# Patient Record
Sex: MALE | Race: AMERICAN INDIAN OR ALASKA NATIVE
[De-identification: names, ages, dates, MRNs, and addresses within clinical notes are randomized per-mention and may not be internally consistent; named-entity substitution may affect disease eponyms.]

---

## 2019-10-22 ENCOUNTER — HOSPITAL ENCOUNTER (OUTPATIENT)
Dept: HOSPITAL 5 - GIO | Age: 49
Discharge: HOME | End: 2019-10-22
Attending: INTERNAL MEDICINE
Payer: OTHER GOVERNMENT

## 2019-10-22 VITALS — SYSTOLIC BLOOD PRESSURE: 150 MMHG | DIASTOLIC BLOOD PRESSURE: 83 MMHG

## 2019-10-22 DIAGNOSIS — K31.84: ICD-10-CM

## 2019-10-22 DIAGNOSIS — Z79.4: ICD-10-CM

## 2019-10-22 DIAGNOSIS — Z79.899: ICD-10-CM

## 2019-10-22 DIAGNOSIS — G47.30: ICD-10-CM

## 2019-10-22 DIAGNOSIS — Z12.11: Primary | ICD-10-CM

## 2019-10-22 DIAGNOSIS — K21.0: ICD-10-CM

## 2019-10-22 DIAGNOSIS — N18.6: ICD-10-CM

## 2019-10-22 DIAGNOSIS — E11.43: ICD-10-CM

## 2019-10-22 DIAGNOSIS — K29.50: ICD-10-CM

## 2019-10-22 DIAGNOSIS — E78.00: ICD-10-CM

## 2019-10-22 DIAGNOSIS — B96.81: ICD-10-CM

## 2019-10-22 DIAGNOSIS — E11.22: ICD-10-CM

## 2019-10-22 DIAGNOSIS — D12.2: ICD-10-CM

## 2019-10-22 DIAGNOSIS — Z98.890: ICD-10-CM

## 2019-10-22 DIAGNOSIS — K64.8: ICD-10-CM

## 2019-10-22 DIAGNOSIS — I12.0: ICD-10-CM

## 2019-10-22 DIAGNOSIS — K57.30: ICD-10-CM

## 2019-10-22 DIAGNOSIS — Z91.013: ICD-10-CM

## 2019-10-22 PROCEDURE — 88342 IMHCHEM/IMCYTCHM 1ST ANTB: CPT

## 2019-10-22 PROCEDURE — 45385 COLONOSCOPY W/LESION REMOVAL: CPT

## 2019-10-22 PROCEDURE — 82962 GLUCOSE BLOOD TEST: CPT

## 2019-10-22 PROCEDURE — 43239 EGD BIOPSY SINGLE/MULTIPLE: CPT

## 2019-10-22 PROCEDURE — 88305 TISSUE EXAM BY PATHOLOGIST: CPT

## 2019-10-22 PROCEDURE — 45380 COLONOSCOPY AND BIOPSY: CPT

## 2019-10-22 NOTE — OPERATIVE REPORT
PROCEDURE:  Colonoscopy.



INDICATIONS:  This is a 49-year-old -American gentleman with an

underlying history of diabetes mellitus, hypertension and end-stage renal

disease, who had a colonoscopy done as part of colon polyp screening.



DESCRIPTION OF PROCEDURE:  Prior to the procedure, he had an EGD done, which

showed presence of moderate erosive esophagitis, gastritis and gastroparesis. 

Colonoscopy was done after getting informed consent with MAC anesthesia. 

Procedure was done in the GI lab with assistance of the GI lab team, which

included RN, Haley Simons, Kate washington, and with assistance of anesthesia. 

Initial rectal exam was unremarkable.  Instrument was passed through the rectum

onto the cecum, which was identified with ileocecal valve and appendiceal

orifice.  Visualization was fair to good.  In the proximal ascending colon that

was close to the ileocecal valve, there was a small 7-8 mm polyp noted that was

removed by cold biopsy.  In the distal ascending colon close to the transverse

colon, there were 2 larger polyps about 12 mm in diameter and a stalk which was

removed by snare excision with application of heat and then both were retrieved.

 The remaining part of the transverse colon except for the distal transverse

colon was essentially unremarkable.  In the distal transverse colon, there was

again a 12-mm polyp that was removed by snare polypectomy, but could not be

retrieved.  The left colon showed few minor diverticula and the rectum showed

mild to moderate internal hemorrhoid on the retroverted view.  There was minimal

bleeding from the polypectomy sites and no complications associated with the

procedure.



ASSESSMENT:  Colon polyp screening, multiple colon polyps and noted mainly in

the proximal colon.  In the distal transverse colon, minor left colon

diverticular disease, mild to moderate internal hemorrhoid.  There was minimal

bleeding associated with the procedure.  No complications associated with the

procedure.  The patient will be encouraged to take fiber supplements, avoid

aspirin and aspirin-related products for the next few days.  Otherwise, resume

previous medication.  The patient will be treated with PPI and Reglan because of

the EGD findings of erosive esophagitis, gastritis and gastroparesis and asked

to follow up in the office in 1-2 weeks' time.





DD: 10/22/2019 09:05

DT: 10/22/2019 09:42

JOB# [id number]  [id number]

ABDIRAHMAN/SAUL

## 2019-10-22 NOTE — PROCEDURE NOTE
Date of procedure: 10/22/19


Pre-op diagnosis: GERD/Colon Polyp Screening


Post-op diagnosis: other (Mild to Moderate Erosive Esophagitis/Gastritis/Gastric

Bezoar (secondary Diabetic Gastropareis)/Multiple Colon Polyps (removed snare 

excision and cold Biopsy from the Proximal Colon and Distal,Transverse Colon).Mi

nor,Left Colon Diverticuli and Mild to Moderate Internal Hemorrhoid)


Procedure: 





EGD with Biopsy and Colonoscopy and Snare Polypectomy and cold Biopsy


Anesthesia: [facility] [place]


Surgeon: CHELY CARRIZALES


Estimated blood loss: minimal


Pathology: list


Specimen disposition: to lab


Condition: stable


Disposition: same day (Treat with PPI and Reglan and encourage fiber intake.  

Avoid aspirin and NSAID for 4 days; otherwise resume home medication. Follow up 

in 1 to 2 weeks ([phone]).)

## 2019-10-22 NOTE — OPERATIVE REPORT
PROCEDURE:  EGD with biopsy.



INDICATIONS:  This is a 49-year-old -American gentleman with an

underlying history of diabetes mellitus type 2, hypertension, end-stage renal

disease who has been having GERD symptoms.  EGD was done to assess for any

significant upper GI pathology



PROCEDURE:  The procedure was done after getting informed consent with MAC

anesthesia.  Instrument was passed through the hypopharynx into the esophagus,

which showed moderate erosive esophagitis.  Biopsy was done from the distal

esophagus.  Stomach showed presence of gastric bezoar suggestive of diabetic

gastroparesis and gastritis.  Biopsy was done from the gastric antrum, gastric

body and angular incisura to rule out for H. pylori and atrophic gastritis. 

There was minimal bleeding from the biopsy sites.  The pylorus was patent.  The

duodenum in the first and second portion appeared normal.  Again, there was

minimal bleeding from the biopsy sites.  No complications associated with the

procedure.



ASSESSMENT:  Gastroesophageal reflux disease symptoms, moderate erosive

esophagitis, diabetic gastroparesis, gastritis.



PLAN:  To treat the patient with PPI and Reglan.  Have the patient to avoid

aspirin and aspirin-related products for the next few days and follow up in the

office in 1-2 weeks' time.  The patient will be placed on PPI and Reglan to help

with GERD symptoms with his GERD symptoms and also with gastroparesis.  A

colonoscopy will be done as part of colon polyp screening.  Procedure was done

in the GI lab with assistance of the GI lab team, which included BELKIS, Kate Pop and with assistance of anesthesia.





DD: 10/22/2019 09:01

DT: 10/22/2019 09:33

JOB# [id number]  [id number]

ABDIRAHMAN/SAUL

## 2019-10-22 NOTE — ANESTHESIA CONSULTATION
Anesthesia Consult and Med Hx


Date of service: 10/22/19





- Airway


Anesthetic Teeth Evaluation: Good


ROM Head & Neck: Adequate


Mental/Hyoid Distance: Adequate


Mallampati Class: Class II


Intubation Access Assessment: Probably Good





- Pulmonary Exam


CTA: Yes





- Cardiac Exam


Cardiac Exam: RRR





- Pre-Operative Health Status


ASA Pre-Surgery Classification: ASA3


Proposed Anesthetic Plan: General, MAC





- Pulmonary


Hx Sleep Apnea: Yes (no CPAP)





- Cardiovascular System


Hx Hypertension: Yes





- Gastrointestinal


Hx Gastroesophageal Reflux Disease: Yes (Food related, diet controlled)





- Endocrine


Hx Renal Disease: Yes


Hx End Stage Renal Disease: Yes (ESRDz, on Dialysis. MWF schedule.)


Hx Insulin Dependent Diabetes: Yes





- Additional Comments


Anesthesia Medical History Comments: Blind, secondary to Diabetic Retinopathy

## 2020-11-13 ENCOUNTER — HOSPITAL ENCOUNTER (INPATIENT)
Dept: HOSPITAL 5 - ED | Age: 50
LOS: 5 days | Discharge: HOME | DRG: 177 | End: 2020-11-18
Attending: INTERNAL MEDICINE | Admitting: INTERNAL MEDICINE
Payer: MEDICAID

## 2020-11-13 DIAGNOSIS — J12.89: ICD-10-CM

## 2020-11-13 DIAGNOSIS — J96.01: ICD-10-CM

## 2020-11-13 DIAGNOSIS — Z83.3: ICD-10-CM

## 2020-11-13 DIAGNOSIS — Z82.49: ICD-10-CM

## 2020-11-13 DIAGNOSIS — Z79.4: ICD-10-CM

## 2020-11-13 DIAGNOSIS — U07.1: Primary | ICD-10-CM

## 2020-11-13 DIAGNOSIS — E11.22: ICD-10-CM

## 2020-11-13 DIAGNOSIS — Z79.899: ICD-10-CM

## 2020-11-13 DIAGNOSIS — D63.1: ICD-10-CM

## 2020-11-13 DIAGNOSIS — N18.6: ICD-10-CM

## 2020-11-13 DIAGNOSIS — I12.0: ICD-10-CM

## 2020-11-13 DIAGNOSIS — Z99.2: ICD-10-CM

## 2020-11-13 DIAGNOSIS — K21.9: ICD-10-CM

## 2020-11-13 DIAGNOSIS — Z91.013: ICD-10-CM

## 2020-11-13 DIAGNOSIS — N25.81: ICD-10-CM

## 2020-11-13 DIAGNOSIS — Z63.5: ICD-10-CM

## 2020-11-13 LAB
ALBUMIN SERPL-MCNC: 3.5 G/DL (ref 3.9–5)
ALT SERPL-CCNC: 6 UNITS/L (ref 7–56)
BASOPHILS # (AUTO): 0 K/MM3 (ref 0–0.1)
BASOPHILS NFR BLD AUTO: 0.6 % (ref 0–1.8)
BUN SERPL-MCNC: 59 MG/DL (ref 9–20)
BUN/CREAT SERPL: 5 %
CALCIUM SERPL-MCNC: 8.6 MG/DL (ref 8.4–10.2)
EOSINOPHIL # BLD AUTO: 0 K/MM3 (ref 0–0.4)
EOSINOPHIL NFR BLD AUTO: 0.1 % (ref 0–4.3)
HCT VFR BLD CALC: 29.9 % (ref 35.5–45.6)
HEMOLYSIS INDEX: 1
HGB BLD-MCNC: 9.7 GM/DL (ref 11.8–15.2)
LYMPHOCYTES # BLD AUTO: 1.6 K/MM3 (ref 1.2–5.4)
LYMPHOCYTES NFR BLD AUTO: 29 % (ref 13.4–35)
MCHC RBC AUTO-ENTMCNC: 32 % (ref 32–34)
MCV RBC AUTO: 79 FL (ref 84–94)
MONOCYTES # (AUTO): 0.7 K/MM3 (ref 0–0.8)
MONOCYTES % (AUTO): 12 % (ref 0–7.3)
PLATELET # BLD: 268 K/MM3 (ref 140–440)
RBC # BLD AUTO: 3.77 M/MM3 (ref 3.65–5.03)

## 2020-11-13 PROCEDURE — U0003 INFECTIOUS AGENT DETECTION BY NUCLEIC ACID (DNA OR RNA); SEVERE ACUTE RESPIRATORY SYNDROME CORONAVIRUS 2 (SARS-COV-2) (CORONAVIRUS DISEASE [COVID-19]), AMPLIFIED PROBE TECHNIQUE, MAKING USE OF HIGH THROUGHPUT TECHNOLOGIES AS DESCRIBED BY CMS-2020-01-R: HCPCS

## 2020-11-13 PROCEDURE — 71045 X-RAY EXAM CHEST 1 VIEW: CPT

## 2020-11-13 PROCEDURE — 87040 BLOOD CULTURE FOR BACTERIA: CPT

## 2020-11-13 PROCEDURE — 80074 ACUTE HEPATITIS PANEL: CPT

## 2020-11-13 PROCEDURE — 96372 THER/PROPH/DIAG INJ SC/IM: CPT

## 2020-11-13 PROCEDURE — 96375 TX/PRO/DX INJ NEW DRUG ADDON: CPT

## 2020-11-13 PROCEDURE — 93005 ELECTROCARDIOGRAM TRACING: CPT

## 2020-11-13 PROCEDURE — 86140 C-REACTIVE PROTEIN: CPT

## 2020-11-13 PROCEDURE — 80048 BASIC METABOLIC PNL TOTAL CA: CPT

## 2020-11-13 PROCEDURE — 82962 GLUCOSE BLOOD TEST: CPT

## 2020-11-13 PROCEDURE — 84145 PROCALCITONIN (PCT): CPT

## 2020-11-13 PROCEDURE — 82728 ASSAY OF FERRITIN: CPT

## 2020-11-13 PROCEDURE — 94760 N-INVAS EAR/PLS OXIMETRY 1: CPT

## 2020-11-13 PROCEDURE — 80053 COMPREHEN METABOLIC PANEL: CPT

## 2020-11-13 PROCEDURE — 36415 COLL VENOUS BLD VENIPUNCTURE: CPT

## 2020-11-13 PROCEDURE — 5A1D70Z PERFORMANCE OF URINARY FILTRATION, INTERMITTENT, LESS THAN 6 HOURS PER DAY: ICD-10-PCS | Performed by: INTERNAL MEDICINE

## 2020-11-13 PROCEDURE — 85025 COMPLETE CBC W/AUTO DIFF WBC: CPT

## 2020-11-13 PROCEDURE — 85379 FIBRIN DEGRADATION QUANT: CPT

## 2020-11-13 PROCEDURE — 83615 LACTATE (LD) (LDH) ENZYME: CPT

## 2020-11-13 RX ADMIN — METOCLOPRAMIDE SCH: 10 TABLET ORAL at 23:39

## 2020-11-13 SDOH — SOCIAL STABILITY - SOCIAL INSECURITY: DISRUPTION OF FAMILY BY SEPARATION AND DIVORCE: Z63.5

## 2020-11-13 NOTE — HISTORY AND PHYSICAL REPORT
History of Present Illness


Chief complaint: 





I feel sick


History of present illness: 


51 YO Male with ESRD on HD(M,W,F), HTN, DM, GERD presents to ED for evaluation. 

Patient states that he has been "feeling sick" over the past 5 days with 

progressive symptoms over the same timeframe.  Patient acknowledges fever, 

malaise, weakness, decreased exercise tolerance, nausea, multiple episodes of 

vomiting, fatigue, as well as a decreased sense of taste.  EMS was notified and 

upon arrival the patient was found to be in distress and subsequently 

transported to [facility] for further care and evaluation of the aforementioned sympt

oms.  Patient seen and evaluated in the emergency department.  All lab and 

imaging studies reviewed.  Patient underwent chest x-ray and found to have 

evidence of bilateral pneumonia.  Patient admitted to medical floor due to 

increased risk of decompensation.  Patient initiated on coronavirus protocol in 

the emergency department prior to my evaluation.  Patient acknowledges fever but

denies chills, chest pain, palpitations, skin rash, recent ill contacts, 

prolonged travel/immobility, unilateral leg swelling, calf pain, 

individual/family history of DVT/PE/bleeding/blood clotting disorders, or known 

exposure to COVID-19.





Past History


Past Medical History: diabetes, ESRD, GERD, hypertension


Past Surgical History: Other (Dialysis access)


Social history: .  denies: smoking, alcohol abuse, prescription drug 

abuse


Family history: diabetes, hypertension





Medications and Allergies


                                    Allergies











Allergy/AdvReac Type Severity Reaction Status Date / Time


 


shellfish derived Allergy  Anaphylaxis Verified 10/22/19 07:36











                                Home Medications











 Medication  Instructions  Recorded  Confirmed  Last Taken  Type


 


Insulin Aspart (Nf) [Novolog] 7 units SQ TID 10/22/19 10/22/19 10/21/19 17:00 

History


 


Metoclopramide [Reglan] 10 mg PO TID 30 Days #40 tab 10/22/19  Unknown Rx


 


Pantoprazole [Protonix] 40 mg PO QDAY 30 Days #30 tablet 10/22/19  Unknown Rx


 


cloNIDine [Catapres] 0.1 mg PO DAILY 10/22/19 10/22/19 10/22/19 06:00 History














Review of Systems


Constitutional: fever, weakness, malaise, lethargy, no weight loss, no weight 

gain


Ears, nose, mouth and throat: no ear pain, no ear discharge, no tinnitis, no 

decreased hearing, no nose pain


Cardiovascular: no chest pain, no orthopnea, no palpitations, no rapid/irregular

 heart beat, no edema


Respiratory: no cough, no cough with sputum, no excessive sputum


Gastrointestinal: no abdominal pain, no nausea, no vomiting, no diarrhea, no 

constipation


Genitourinary Male: no hematuria, no flank pain, no discharge, no urinary 

frequency, no urinary hesitancy


Rectal: no pain, no incontinence, no bleeding


Musculoskeletal: no neck stiffness, no neck pain, no shooting arm pain, no arm 

numbness/tingling, no low back pain


Integumentary: no rash, no pruritis, no redness, no sores, no wounds


Neurological: no transient paralysis, no paralysis, no weakness, no parathesias,

 no numbness, no tingling, no seizures, no syncope, no tremors


Psychiatric: no anxiety, no memory loss, no change in sleep habits, no sleep 

disturbances, no insomnia


Endocrine: no cold intolerance, no heat intolerance, no polyphagia, no excessive

 thirst, no excessive sweating


Hematologic/Lymphatic: no easy bruising, no lymphedema


Allergic/Immunologic: no urticaria, no allergic rhinitis, no persistent 

infections





Exam





- Constitutional


Vitals: 


                                        











Temp Pulse Resp BP Pulse Ox


 


 99.9 F H  84   10 L  115/72   90 


 


 11/13/20 08:17  11/13/20 10:45  11/13/20 10:45  11/13/20 10:45  11/13/20 10:45











General appearance: Present: mild distress





- EENT


Eyes: Present: PERRL


ENT: hearing intact, clear oral mucosa





- Neck


Neck: Present: supple, normal ROM





- Respiratory


Respiratory effort: normal


Respiratory: bilateral: diminished, rhonchi





- Cardiovascular


Heart Sounds: Present: S1 & S2.  Absent: rub, click





- Extremities


Extremities: pulses symmetrical, No edema


Peripheral Pulses: within normal limits





- Abdominal


General gastrointestinal: Present: soft, non-tender, non-distended, normal bowel

 sounds


Male genitourinary: Present: normal





- Integumentary


Integumentary: Present: clear, warm, dry





- Musculoskeletal


Musculoskeletal: gait normal, strength equal bilaterally





- Psychiatric


Psychiatric: appropriate mood/affect, intact judgment & insight





- Neurologic


Neurologic: CNII-XII intact, moves all extremities





Results





- Labs


CBC & Chem 7: 


                                 11/13/20 09:03





                                 11/13/20 09:03


Labs: 


                              Abnormal lab results











  11/13/20 11/13/20 Range/Units





  09:03 09:03 


 


Hgb  9.7 L   (11.8-15.2)  gm/dl


 


Hct  29.9 L   (35.5-45.6)  %


 


MCV  79 L   (84-94)  fl


 


MCH  26 L   (28-32)  pg


 


RDW  18.4 H   (13.2-15.2)  %


 


Mono % (Auto)  12.0 H   (0.0-7.3)  %


 


Sodium   135 L  (137-145)  mmol/L


 


Chloride   91.6 L  ()  mmol/L


 


BUN   59 H  (9-20)  mg/dL


 


Creatinine   12.0 H  (0.8-1.3)  mg/dL


 


Glucose   138 H  ()  mg/dL


 


ALT   6 L  (7-56)  units/L


 


Albumin   3.5 L  (3.9-5)  g/dL














Assessment and Plan





- Patient Problems


(1) Pneumonia


Current Visit: Yes   Status: Acute   


Plan to address problem: 


Pneumonia protocol: CBC, BMP, chest x-ray, supplemental oxygen, nebulizer 

therapy, IV antibiotic therapy, blood culture, supportive care.








(2) ESRD needing dialysis


Current Visit: Yes   Status: Acute   


Plan to address problem: 


Nephrology team consulted in ED, strict I's/O, monitor urine output every shift,

 avoid nephrotoxic agents, dialysis as per renal team.








(3) Suspected 2019 novel coronavirus infection


Current Visit: Yes   Status: Acute   


Plan to address problem: 


Coronavirus protocol: Coronavirus PCR ordered prior to my evaluation in the 

emergency department, IV steroid therapy, contact isolation, respiratory 

isolation, supportive care.








(4) Hypertension


Current Visit: Yes   Status: Acute   


Qualifiers: 


   Hypertension type: essential hypertension   Qualified Code(s): I10 - 

Essential (primary) hypertension   


Plan to address problem: 


Monitor blood pressure every shift, continue medical management.








(5) Diabetes


Current Visit: Yes   Status: Acute   


Plan to address problem: 


Sliding-scale insulin therapy, Accu-Chek, consistent carbohydrate diet, 

hypoglycemia protocol.








(6) GERD (gastroesophageal reflux disease)


Current Visit: Yes   Status: Acute   


Qualifiers: 


   Esophagitis presence: without esophagitis   Qualified Code(s): K21.9 - 

Gastro-esophageal reflux disease without esophagitis   


Plan to address problem: 


PPI therapy, supportive care.  Outpatient GI follow-up.








(7) DVT prophylaxis


Current Visit: Yes   Status: Acute   


Plan to address problem: 


SCD to bilateral lower extremities while in bed, prophylactic anticoagulation

## 2020-11-13 NOTE — XRAY REPORT
XR chest 1V ap



INDICATION / CLINICAL INFORMATION:

cough, fever.



COMPARISON: 

None



FINDINGS:

There are patchy bibasilar airspace opacities. No pleural effusion or pneumothorax. Heart is accentua
lion without significant pulmonary vasculature congestion.



IMPRESSION:

Patchy bibasilar airspace opacities, suspicious for pneumonia.



Signer Name: Micah Gonzalez MD 

Signed: 11/13/2020 9:25 AM

Workstation Name: [id number]

## 2020-11-13 NOTE — EMERGENCY DEPARTMENT REPORT
HPI





- General


Chief Complaint: Fever


Time Seen by Provider: 11/13/20 08:27





- HPI


HPI: 





This is a 50-year-old -American male who presents to the emergency 

department with a complaint of a 4 to 5-day history of nausea, vomiting and 

fatigue.  Patient presents with a low-grade fever.  He has a past medical 

history of diabetes, hypertension and end-stage renal disease on hemodialysis on

Monday/Wednesday/Friday.  The patient does not know the name of his nephrologist

but says he gets dialyzed at [facility] and last had dialysis on 

Wednesday, 2 days ago.  Patient has a mild cough but denies any chest pain or 

shortness of breath.  He denies any abdominal pain to go along with the 

expressed nausea/vomiting.  He has not taken anything for symptoms prior to 

presentation.  No recent travel or sick contacts at home.  No known exposure to 

anyone with COVID-19.





ED Past Medical Hx





- Past Medical History


Previous Medical History?: Yes


Hx Hypertension: Yes


Hx Diabetes: Yes


Hx GERD: Yes


Hx Renal Disease: Yes





- Surgical History


Past Surgical History?: Yes


Additional Surgical History: graft





- Social History


Smoking Status: Never Smoker


Substance Use Type: None





- Medications


Home Medications: 


                                Home Medications











 Medication  Instructions  Recorded  Confirmed  Last Taken  Type


 


Insulin Aspart (Nf) [Novolog] 7 units SQ TID 10/22/19 10/22/19 10/21/19 17:00 

History


 


Metoclopramide [Reglan] 10 mg PO TID 30 Days #40 tab 10/22/19  Unknown Rx


 


Pantoprazole [Protonix] 40 mg PO QDAY 30 Days #30 tablet 10/22/19  Unknown Rx


 


cloNIDine [Catapres] 0.1 mg PO DAILY 10/22/19 10/22/19 10/22/19 06:00 History














ED Review of Systems


ROS: 


Stated complaint: PUI


Other details as noted in HPI





Comment: All other systems reviewed and negative


Constitutional: fever.  denies: diaphoresis


Eyes: denies: eye pain, vision change


ENT: denies: ear pain, throat pain


Respiratory: cough.  denies: shortness of breath


Cardiovascular: denies: chest pain, palpitations


Gastrointestinal: nausea, vomiting.  denies: abdominal pain


Genitourinary: denies: dysuria, discharge


Musculoskeletal: myalgia.  denies: joint swelling


Skin: denies: rash, lesions


Neurological: denies: numbness, paresthesias





Physical Exam





- Physical Exam


Vital Signs: 


                                   Vital Signs











  11/13/20





  08:17


 


Temperature 99.9 F H


 


Pulse Rate 92 H


 


Respiratory 18





Rate 


 


Blood Pressure 151/77


 


Blood Pressure 151/77





[Right] 


 


O2 Sat by Pulse 100





Oximetry 











Physical Exam: 





GENERAL: The patient is well-developed well-nourished.


HENT: Normocephalic.  Atraumatic.    Patient has moist mucous membranes.


EYES: Extraocular motions are intact.  


NECK: Supple. Trachea is midline.


CHEST/LUNGS: Clear to auscultation.  No tachypnea or accessory muscle use.  A 

productive sounding cough heard during examination.  There is no respiratory 

distress noted.


HEART/CARDIOVASCULAR: Regular.  There is no tachycardia.  


ABDOMEN: Abdomen is soft, nontender.  Patient has normal bowel sounds. 


SKIN: Skin is warm and dry. 


NEURO: The patient is awake, alert, and oriented.  The patient is cooperative. 

Normal speech.


MUSCULOSKELETAL: There is no tenderness or deformity.  There is no limitation 

range of motion.  





ED Course


                                   Vital Signs











  11/13/20





  08:17


 


Temperature 99.9 F H


 


Pulse Rate 92 H


 


Respiratory 18





Rate 


 


Blood Pressure 151/77


 


Blood Pressure 151/77





[Right] 


 


O2 Sat by Pulse 100





Oximetry 














- Reevaluation(s)


Reevaluation #1: 





11/13/20 13:34


                                   Lab Results











  11/13/20 11/13/20 Range/Units





  09:03 09:03 


 


WBC  5.6   (4.5-11.0)  K/mm3


 


RBC  3.77   (3.65-5.03)  M/mm3


 


Hgb  9.7 L   (11.8-15.2)  gm/dl


 


Hct  29.9 L   (35.5-45.6)  %


 


MCV  79 L   (84-94)  fl


 


MCH  26 L   (28-32)  pg


 


MCHC  32   (32-34)  %


 


RDW  18.4 H   (13.2-15.2)  %


 


Plt Count  268   (140-440)  K/mm3


 


Lymph % (Auto)  29.0   (13.4-35.0)  %


 


Mono % (Auto)  12.0 H   (0.0-7.3)  %


 


Eos % (Auto)  0.1   (0.0-4.3)  %


 


Baso % (Auto)  0.6   (0.0-1.8)  %


 


Lymph # (Auto)  1.6   (1.2-5.4)  K/mm3


 


Mono # (Auto)  0.7   (0.0-0.8)  K/mm3


 


Eos # (Auto)  0.0   (0.0-0.4)  K/mm3


 


Baso # (Auto)  0.0   (0.0-0.1)  K/mm3


 


Seg Neutrophils %  58.3   (40.0-70.0)  %


 


Seg Neutrophils #  3.3   (1.8-7.7)  K/mm3


 


Sodium   135 L  (137-145)  mmol/L


 


Potassium   4.3  (3.6-5.0)  mmol/L


 


Chloride   91.6 L  ()  mmol/L


 


Carbon Dioxide   29  (22-30)  mmol/L


 


Anion Gap   19  mmol/L


 


BUN   59 H  (9-20)  mg/dL


 


Creatinine   12.0 H  (0.8-1.3)  mg/dL


 


Estimated GFR   5  ml/min


 


BUN/Creatinine Ratio   5  %


 


Glucose   138 H  ()  mg/dL


 


Calcium   8.6  (8.4-10.2)  mg/dL


 


Total Bilirubin   0.20  (0.1-1.2)  mg/dL


 


AST   19  (5-40)  units/L


 


ALT   6 L  (7-56)  units/L


 


Alkaline Phosphatase   61  ()  units/L


 


Total Protein   7.0  (6.3-8.2)  g/dL


 


Albumin   3.5 L  (3.9-5)  g/dL


 


Albumin/Globulin Ratio   1.0  %











Reevaluation #2: 





11/13/20 13:34


                                   Vital Signs











  11/13/20 11/13/20 11/13/20





  08:16 08:17 08:31


 


Temperature  99.9 F H 


 


Pulse Rate  92 H 93 H


 


Respiratory 12 18 11 L





Rate   


 


Blood Pressure  151/77 151/77


 


Blood Pressure  151/77 





[Right]   


 


O2 Sat by Pulse 96 100 97





Oximetry   














  11/13/20 11/13/20 11/13/20





  08:45 09:01 09:15


 


Temperature   


 


Pulse Rate 92 H 90 91 H


 


Respiratory 16 20 15





Rate   


 


Blood Pressure 151/77 151/77 151/77


 


Blood Pressure   





[Right]   


 


O2 Sat by Pulse 95 90 93





Oximetry   














  11/13/20 11/13/20 11/13/20





  09:31 09:45 10:01


 


Temperature   


 


Pulse Rate 89 88 87


 


Respiratory 19 17 18





Rate   


 


Blood Pressure 139/78 139/78 139/78


 


Blood Pressure   





[Right]   


 


O2 Sat by Pulse 94 91 91





Oximetry   














  11/13/20 11/13/20 11/13/20





  10:12 10:15 10:30


 


Temperature   


 


Pulse Rate  88 


 


Respiratory 18 8 L 18





Rate   


 


Blood Pressure  139/78 


 


Blood Pressure   





[Right]   


 


O2 Sat by Pulse  95 





Oximetry   














  11/13/20 11/13/20 11/13/20





  10:31 10:45 11:30


 


Temperature   


 


Pulse Rate 86 84 


 


Respiratory 11 L 10 L 18





Rate   


 


Blood Pressure 115/72 115/72 


 


Blood Pressure   





[Right]   


 


O2 Sat by Pulse 94 90 





Oximetry   














- Consultations


Consultation #1: 





11/13/20 11:18


I spoke to the nephrologist on-call, Dr. Dawkins.  She is aware of this 

patient as the patient follows with Dr. Han whom often admits people to 

their clinic.  Dr. Dawkins will consult on this patient and is going to 

provide orders for dialysis to be done today.





ED Medical Decision Making





- Lab Data


Result diagrams: 


                                 11/13/20 09:03





                                 11/13/20 09:03





- EKG Data


-: EKG Interpreted by Me


EKG shows normal: sinus rhythm, axis (Left axis deviation), intervals, QRS 

complexes (LVH, Q waves to the septal and inferior leads)


Rate: normal





- EKG Data


When compared to previous EKG there are: previous EKG unavailable


Interpretation: other (Sinus rhythm at 94 bpm, left axis deviation, LVH, Q waves

 to the inferior and septal leads)





- Radiology Data


Radiology results: report reviewed





XR chest 1V ap INDICATION / CLINICAL INFORMATION: cough, fever. COMPARISON: None

 FINDINGS: There are patchy bibasilar airspace opacities. No pleural effusion or

 pneumothorax. Heart is accentuated without significant pulmonary vasculature 

congestion. IMPRESSION: Patchy bibasilar airspace opacities, suspicious for 

pneumonia. 





- Medical Decision Making





Patient presents to the emergency department with a complaint of a 4 to 5-day 

history of nausea and vomiting and extreme fatigue.  He presents with a low-

grade fever of about 100 F.  Chest x-ray shows patchy bilateral opacities 

concerning for pneumonia.  An IV was placed and the patient was given 

antiemetics and IV antibiotics.  Patient's labs show renal failure consistent 

with the patient's end-stage renal disease on hemodialysis.  Nephrology was 

contacted and consulted and the patient will receive dialysis today.  Patient 

was accepted for admission by the hospitalist, Dr. Koenig.





Given the low-grade fever, and the chest x-ray results, the patient is suspected

 for COVID-19.





The patient was placed in patient isolation and droplet precautions immediately 

upon arrival to the main emergency department.  I wore full PPE gear including a

 surgical hat, goggles, N95 mask, surgical mask, gown, and double gloves for 

every encounter.


Critical Care Time: No


Critical care attestation.: 


If time is entered above; I have spent that time in minutes in the direct care 

of this critically ill patient, excluding procedure time.








ED Disposition


Clinical Impression: 


 Suspected 2019 novel coronavirus infection, ESRD needing dialysis





Nausea & vomiting


Qualifiers:


 Vomiting type: unspecified Vomiting Intractability: non-intractable Qualified 

Code(s): R11.2 - Nausea with vomiting, unspecified





Disposition: DC-09 OP ADMIT IP TO THIS HOSP


Is pt being admited?: Yes


Condition: Serious


Time of Disposition: 13:38

## 2020-11-14 LAB
BASOPHILS # (AUTO): 0 K/MM3 (ref 0–0.1)
BASOPHILS NFR BLD AUTO: 0.8 % (ref 0–1.8)
BUN SERPL-MCNC: 44 MG/DL (ref 9–20)
BUN/CREAT SERPL: 4 %
CALCIUM SERPL-MCNC: 8.7 MG/DL (ref 8.4–10.2)
EOSINOPHIL # BLD AUTO: 0 K/MM3 (ref 0–0.4)
EOSINOPHIL NFR BLD AUTO: 0 % (ref 0–4.3)
HCT VFR BLD CALC: 30.3 % (ref 35.5–45.6)
HEMOLYSIS INDEX: 4
HGB BLD-MCNC: 9.8 GM/DL (ref 11.8–15.2)
LYMPHOCYTES # BLD AUTO: 1.3 K/MM3 (ref 1.2–5.4)
LYMPHOCYTES NFR BLD AUTO: 29.6 % (ref 13.4–35)
MCHC RBC AUTO-ENTMCNC: 32 % (ref 32–34)
MCV RBC AUTO: 79 FL (ref 84–94)
MONOCYTES # (AUTO): 0.5 K/MM3 (ref 0–0.8)
MONOCYTES % (AUTO): 10.9 % (ref 0–7.3)
PLATELET # BLD: 224 K/MM3 (ref 140–440)
RBC # BLD AUTO: 3.83 M/MM3 (ref 3.65–5.03)

## 2020-11-14 RX ADMIN — CEFTRIAXONE SODIUM SCH MLS/HR: 2 INJECTION, POWDER, FOR SOLUTION INTRAMUSCULAR; INTRAVENOUS at 09:35

## 2020-11-14 RX ADMIN — CALCITRIOL SCH MCG: 0.25 CAPSULE ORAL at 16:31

## 2020-11-14 RX ADMIN — HEPARIN SODIUM SCH UNIT: 5000 INJECTION, SOLUTION INTRAVENOUS; SUBCUTANEOUS at 09:36

## 2020-11-14 RX ADMIN — METHYLPREDNISOLONE SODIUM SUCCINATE SCH MG: 40 INJECTION, POWDER, FOR SOLUTION INTRAMUSCULAR; INTRAVENOUS at 21:18

## 2020-11-14 RX ADMIN — INSULIN LISPRO SCH: 100 INJECTION, SOLUTION INTRAVENOUS; SUBCUTANEOUS at 21:15

## 2020-11-14 RX ADMIN — METHYLPREDNISOLONE SODIUM SUCCINATE SCH MG: 40 INJECTION, POWDER, FOR SOLUTION INTRAMUSCULAR; INTRAVENOUS at 06:24

## 2020-11-14 RX ADMIN — INSULIN LISPRO SCH UNIT: 100 INJECTION, SOLUTION INTRAVENOUS; SUBCUTANEOUS at 18:19

## 2020-11-14 RX ADMIN — Medication SCH ML: at 21:18

## 2020-11-14 RX ADMIN — INSULIN LISPRO SCH UNIT: 100 INJECTION, SOLUTION INTRAVENOUS; SUBCUTANEOUS at 13:48

## 2020-11-14 RX ADMIN — PANTOPRAZOLE SODIUM SCH MG: 40 TABLET, DELAYED RELEASE ORAL at 09:37

## 2020-11-14 RX ADMIN — AZITHROMYCIN SCH MG: 250 TABLET, FILM COATED ORAL at 09:36

## 2020-11-14 RX ADMIN — Medication SCH: at 21:16

## 2020-11-14 RX ADMIN — METOCLOPRAMIDE SCH MG: 10 TABLET ORAL at 09:36

## 2020-11-14 RX ADMIN — Medication SCH ML: at 09:37

## 2020-11-14 RX ADMIN — METOCLOPRAMIDE SCH MG: 10 TABLET ORAL at 21:17

## 2020-11-14 RX ADMIN — INSULIN LISPRO SCH: 100 INJECTION, SOLUTION INTRAVENOUS; SUBCUTANEOUS at 09:17

## 2020-11-14 RX ADMIN — METHYLPREDNISOLONE SODIUM SUCCINATE SCH MG: 40 INJECTION, POWDER, FOR SOLUTION INTRAMUSCULAR; INTRAVENOUS at 13:49

## 2020-11-14 RX ADMIN — NIFEDIPINE SCH MG: 60 TABLET, EXTENDED RELEASE ORAL at 16:31

## 2020-11-14 RX ADMIN — HEPARIN SODIUM SCH UNIT: 5000 INJECTION, SOLUTION INTRAVENOUS; SUBCUTANEOUS at 21:18

## 2020-11-14 RX ADMIN — METHYLPREDNISOLONE SODIUM SUCCINATE SCH: 40 INJECTION, POWDER, FOR SOLUTION INTRAMUSCULAR; INTRAVENOUS at 21:16

## 2020-11-14 RX ADMIN — METOCLOPRAMIDE SCH MG: 10 TABLET ORAL at 13:48

## 2020-11-14 RX ADMIN — HEPARIN SODIUM SCH: 5000 INJECTION, SOLUTION INTRAVENOUS; SUBCUTANEOUS at 21:15

## 2020-11-14 RX ADMIN — SEVELAMER CARBONATE SCH MG: 800 TABLET, FILM COATED ORAL at 16:31

## 2020-11-14 NOTE — PROGRESS NOTE
Assessment and Plan





Impression:


* End stage renal disease on HD MWF


* Pneumonia, bilateral - r/o COVID 19 PNA


* COVID 19 PUI


* Hypertension


* Type II DM


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


* Secondary hyperparathyroidism





Plan:


* Patient is s/p HD yesterday. No acute need for HD today


* Continue MWF schedule


* COVID 19 pending


* Blood pressure is uncontrolled - will resume home medications


* Dose medications for renal function


* Renal diet


* Epogen TIW prn


* Obtained med list from outpatient dialysis clinic:  Coreg 25mg 1/2 BID, 

  Nifedipine XL 60mg daily, Calcitriol 0.25mcg daily, Sensipar 30mg daily, 

  Lipitor 80mg daily, Renvela 1 with meals.  Meds reordered.





Subjective


Date of service: 11/14/20


Principal diagnosis: Bilateral lung PNA


Interval history: 





Chart, vitals, labs reviewed.  Currently on 3L





Objective





- Exam


Narrative Exam: 





In effort of PPE conservation strategy and to reduce transmission of COVID 19, 

physical exam deferred.





- Vital Signs


Vital signs: 


                               Vital Signs - 12hr











  11/14/20





  02:59


 


Temperature 97.8 F


 


Pulse Rate 96 H


 


Respiratory 20





Rate 


 


Blood Pressure 130/81


 


O2 Sat by Pulse 97





Oximetry 














- Lab





                                 11/14/20 06:12





                                 11/14/20 06:12


                             Most recent lab results











Calcium  8.7 mg/dL (8.4-10.2)   11/14/20  06:12    














Medications & Allergies





- Medications


Allergies/Adverse Reactions: 


                                    Allergies





shellfish derived Allergy (Verified 10/22/19 07:36)


   Anaphylaxis








Home Medications: 


                                Home Medications











 Medication  Instructions  Recorded  Confirmed  Last Taken  Type


 


Insulin Aspart (Nf) [Novolog] 7 units SQ TID 10/22/19 11/13/20 10/21/19 17:00 

History


 


Metoclopramide [Reglan] 10 mg PO TID 30 Days #40 tab 10/22/19 11/13/20 Unknown 

Rx


 


Pantoprazole [Protonix] 40 mg PO QDAY 30 Days #30 tablet 10/22/19 11/13/20 [facility]

wn Rx


 


cloNIDine [Catapres] 0.1 mg PO DAILY 10/22/19 11/13/20 10/22/19 06:00 History











Active Medications: 














Generic Name Dose Route Start Last Admin





  Trade Name Freq  PRN Reason Stop Dose Admin


 


Acetaminophen  650 mg  11/13/20 12:00 





  Tylenol  PO  





  Q4H PRN  





  Pain MILD(1-3)/Fever >100.5/HA  


 


Albuterol  2.5 mg  11/13/20 12:00 





  Proventil  IH  





  Q4HRT PRN  





  Shortness Of Breath  


 


Azithromycin  500 mg  11/14/20 10:00  11/14/20 09:36





  Zithromax  PO   500 mg





  QDAY CHRIS   Administration


 


Clonidine HCl  0.1 mg  11/14/20 10:00  11/14/20 09:36





  Catapres  PO   0.1 mg





  DAILY CHRIS   Administration


 


Heparin Sodium (Porcine)  5,000 unit  11/13/20 22:00  11/14/20 09:36





  Heparin  SUB-Q   5,000 unit





  Q12HR CHRIS   Administration


 


Hydralazine HCl  5 mg  11/14/20 10:16 





  Apresoline  IV  





  Q4H PRN  





  Hypertension  


 


Ceftriaxone Sodium  2 gm in 100 mls @ 200 mls/hr  11/14/20 10:00  11/14/20 09:35





  Rocephin/Ns 2 Gm/100 Ml  IV  11/15/20 23:59  200 mls/hr





  Q24HR CHRIS   Administration





  Protocol  


 


Sodium Chloride  100 mls @ 999 mls/hr  11/13/20 11:40 





  Nacl 0.9%  IV  





  ANALY PRN  





  Hypotension  


 


Insulin Human Lispro  7 unit  11/13/20 18:30  11/14/20 09:17





  Humalog  SUB-Q   Not Given





  TIDAC CHRIS  


 


Lactulose  20 gm  11/14/20 10:16 





  Cephulac  PO  





  QDAY PRN  





  Constipation  


 


Methylprednisolone Sodium Succinate  40 mg  11/13/20 22:00  11/14/20 06:24





  Solu-Medrol  IV   40 mg





  Q8HR CHRIS   Administration


 


Metoclopramide HCl  5 mg  11/13/20 20:00  11/14/20 09:36





  Reglan  PO   5 mg





  TID CHRIS   Administration


 


Ondansetron HCl  4 mg  11/13/20 12:00 





  Zofran  IV  





  Q8H PRN  





  Nausea And Vomiting  


 


Pantoprazole Sodium  40 mg  11/14/20 10:00  11/14/20 09:37





  Protonix  PO   40 mg





  QDAY CHRIS   Administration


 


Sodium Chloride  10 ml  11/13/20 22:00  11/14/20 09:37





  Sodium Chloride Flush Syringe 10 Ml  IV   10 ml





  BID CHRIS   Administration


 


Sodium Chloride  10 ml  11/13/20 12:00 





  Sodium Chloride Flush Syringe 10 Ml  IV  





  PRN PRN  





  LINE FLUSH  


 


Trazodone HCl  50 mg  11/14/20 22:00 





  Desyrel  PO  





  QHS CHRIS

## 2020-11-14 NOTE — PROGRESS NOTE
Assessment and Plan








- Patient Problems


(1) Pneumonia


Current Visit: Yes   Status: Acute   


Plan to address problem: 


ontinue IV abx therapy azithromycin and Rocephine


AM lab CBC, BMP,  supplemental oxygen and  nebulizer therapy,


blood culture, supportive care.








(2) ESRD needing dialysis


Current Visit: Yes   Status: Acute   


Plan to address problem: 


HD per Nephrology


avoid nephrotoxic agents








(3) Suspected 2019 novel coronavirus infection


Current Visit: Yes   Status: Acute   


Plan to address problem: 


Coronavirus PCR ordered -result pending


Continue IV steroid therapy, contact isolation, respiratory isolation, 

supportive care.








(4) Hypertension-stable


Current Visit: Yes   Status: Acute   


Qualifiers: 


   Hypertension type: essential hypertension   Qualified Code(s): I10 - 

Essential (primary) hypertension   


Plan to address problem: 


Monitor blood pressure every shift,


continue medical management.


PRN hydralazine








(5) Diabetes


Current Visit: Yes   Status: Acute   


Plan to address problem: 


Monitor blood sugar with SSI


Advised to avoid concentrated high carbohydrate diet,


hypoglycemia protocol.








(6) GERD (gastroesophageal reflux disease)


Current Visit: Yes   Status: Acute   


Qualifiers: 


   Esophagitis presence: without esophagitis   Qualified Code(s): K21.9 - 

Gastro-esophageal reflux disease without esophagitis   


Plan to address problem: 


PPI therapy,








(7) DVT prophylaxis


Current Visit: Yes   Status: Acute   


Plan to address problem: 


SCD to bilateral lower extremities while in bed, prophylactic anticoagulation








Subjective


Date of service: 11/14/20


Principal diagnosis: Bilateral lung PNA


Interval history: 





Patient seen at bedside-on room air eating lunch-he denies any acute distress


Reviewed lab, mar, and v/s





Objective





- Constitutional


Vitals: 


                               Vital Signs - 12hr











  11/13/20 11/13/20 11/13/20





  22:15 22:28 22:31


 


Temperature   


 


Pulse Rate 92 H 87 94 H


 


Respiratory 14  18





Rate   


 


Blood Pressure 175/96 162/76 162/76


 


O2 Sat by Pulse 95  97





Oximetry   


 


O2 Sat by Pulse   





Oximetry [   





Anterior   





Bilateral]   














  11/13/20 11/13/20 11/13/20





  22:45 23:01 23:15


 


Temperature 97.7 F  


 


Pulse Rate 96 H 93 H 93 H


 


Respiratory 15 16 17





Rate   


 


Blood Pressure 169/85 160/77 169/83


 


O2 Sat by Pulse 95 94 92





Oximetry   


 


O2 Sat by Pulse 92  





Oximetry [   





Anterior   





Bilateral]   














  11/14/20





  02:59


 


Temperature 97.8 F


 


Pulse Rate 96 H


 


Respiratory 20





Rate 


 


Blood Pressure 130/81


 


O2 Sat by Pulse 97





Oximetry 


 


O2 Sat by Pulse 





Oximetry [ 





Anterior 





Bilateral] 











General appearance: Present: no acute distress, well-nourished





- EENT


Eyes: PERRL, EOM intact


ENT: hearing intact, clear oral mucosa


Ears: bilateral: normal





- Neck


Neck: supple, normal ROM





- Respiratory


Respiratory effort: normal


Respiratory: bilateral: CTA





- Breasts


Breasts: normal





- Cardiovascular


Heart rate: 74


Rhythm: regular


Heart Sounds: Present: S1 & S2.  Absent: gallop, rub


Extremities: pulses intact, No edema, normal color, Full ROM





- Gastrointestinal


General gastrointestinal: Present: soft, non-tender, non-distended, normal bowel

sounds





- Genitourinary


Male genitourinary: normal





- Integumentary


Integumentary: clear, warm, dry





- Musculoskeletal


Musculoskeletal: strength equal bilaterally, other (BKA of the left leg)





- Neurologic


Neurologic: moves all extremities





- Psychiatric


Psychiatric: memory intact, appropriate mood/affect, intact judgment & insight





- Labs


CBC & Chem 7: 


                                 11/14/20 06:12





                                 11/14/20 06:12


Labs: 


                              Abnormal lab results











  11/14/20 11/14/20 11/14/20 Range/Units





  01:16 06:12 06:12 


 


WBC   4.4 L   (4.5-11.0)  K/mm3


 


Hgb   9.8 L   (11.8-15.2)  gm/dl


 


Hct   30.3 L   (35.5-45.6)  %


 


MCV   79 L   (84-94)  fl


 


MCH   26 L   (28-32)  pg


 


RDW   17.7 H   (13.2-15.2)  %


 


Mono % (Auto)   10.9 H   (0.0-7.3)  %


 


Sodium    136 L  (137-145)  mmol/L


 


Chloride    92.1 L  ()  mmol/L


 


Carbon Dioxide    31 H  (22-30)  mmol/L


 


BUN    44 H  (9-20)  mg/dL


 


Creatinine    10.0 H  (0.8-1.3)  mg/dL


 


Glucose    142 H  ()  mg/dL


 


POC Glucose  132 H    ()  mg/dL














  11/14/20 Range/Units





  08:46 


 


WBC   (4.5-11.0)  K/mm3


 


Hgb   (11.8-15.2)  gm/dl


 


Hct   (35.5-45.6)  %


 


MCV   (84-94)  fl


 


MCH   (28-32)  pg


 


RDW   (13.2-15.2)  %


 


Mono % (Auto)   (0.0-7.3)  %


 


Sodium   (137-145)  mmol/L


 


Chloride   ()  mmol/L


 


Carbon Dioxide   (22-30)  mmol/L


 


BUN   (9-20)  mg/dL


 


Creatinine   (0.8-1.3)  mg/dL


 


Glucose   ()  mg/dL


 


POC Glucose  111 H  ()  mg/dL

## 2020-11-15 LAB — CRP SERPL-MCNC: 6 MG/DL (ref 0–1.3)

## 2020-11-15 RX ADMIN — SEVELAMER CARBONATE SCH MG: 800 TABLET, FILM COATED ORAL at 17:36

## 2020-11-15 RX ADMIN — INSULIN LISPRO SCH UNIT: 100 INJECTION, SOLUTION INTRAVENOUS; SUBCUTANEOUS at 11:16

## 2020-11-15 RX ADMIN — HEPARIN SODIUM SCH UNIT: 5000 INJECTION, SOLUTION INTRAVENOUS; SUBCUTANEOUS at 11:17

## 2020-11-15 RX ADMIN — SEVELAMER CARBONATE SCH MG: 800 TABLET, FILM COATED ORAL at 11:18

## 2020-11-15 RX ADMIN — Medication SCH ML: at 22:15

## 2020-11-15 RX ADMIN — CALCITRIOL SCH MCG: 0.25 CAPSULE ORAL at 11:17

## 2020-11-15 RX ADMIN — CEFTRIAXONE SODIUM SCH MLS/HR: 2 INJECTION, POWDER, FOR SOLUTION INTRAMUSCULAR; INTRAVENOUS at 11:17

## 2020-11-15 RX ADMIN — INSULIN LISPRO SCH UNIT: 100 INJECTION, SOLUTION INTRAVENOUS; SUBCUTANEOUS at 17:35

## 2020-11-15 RX ADMIN — SEVELAMER CARBONATE SCH: 800 TABLET, FILM COATED ORAL at 14:11

## 2020-11-15 RX ADMIN — Medication SCH ML: at 11:18

## 2020-11-15 RX ADMIN — INSULIN LISPRO SCH: 100 INJECTION, SOLUTION INTRAVENOUS; SUBCUTANEOUS at 14:12

## 2020-11-15 RX ADMIN — NIFEDIPINE SCH MG: 60 TABLET, EXTENDED RELEASE ORAL at 11:17

## 2020-11-15 RX ADMIN — METHYLPREDNISOLONE SODIUM SUCCINATE SCH MG: 40 INJECTION, POWDER, FOR SOLUTION INTRAMUSCULAR; INTRAVENOUS at 05:34

## 2020-11-15 RX ADMIN — METOCLOPRAMIDE SCH MG: 10 TABLET ORAL at 22:14

## 2020-11-15 RX ADMIN — METOCLOPRAMIDE SCH MG: 10 TABLET ORAL at 11:23

## 2020-11-15 RX ADMIN — AZITHROMYCIN SCH MG: 250 TABLET, FILM COATED ORAL at 11:17

## 2020-11-15 RX ADMIN — Medication SCH MG: at 11:18

## 2020-11-15 RX ADMIN — METOCLOPRAMIDE SCH MG: 10 TABLET ORAL at 14:46

## 2020-11-15 RX ADMIN — METHYLPREDNISOLONE SODIUM SUCCINATE SCH MG: 40 INJECTION, POWDER, FOR SOLUTION INTRAMUSCULAR; INTRAVENOUS at 14:46

## 2020-11-15 RX ADMIN — HEPARIN SODIUM SCH UNIT: 5000 INJECTION, SOLUTION INTRAVENOUS; SUBCUTANEOUS at 22:15

## 2020-11-15 RX ADMIN — OXYCODONE HYDROCHLORIDE AND ACETAMINOPHEN SCH MG: 500 TABLET ORAL at 11:17

## 2020-11-15 RX ADMIN — PANTOPRAZOLE SODIUM SCH MG: 40 TABLET, DELAYED RELEASE ORAL at 11:17

## 2020-11-15 RX ADMIN — METHYLPREDNISOLONE SODIUM SUCCINATE SCH MG: 40 INJECTION, POWDER, FOR SOLUTION INTRAMUSCULAR; INTRAVENOUS at 22:15

## 2020-11-15 NOTE — PROGRESS NOTE
Assessment and Plan





Impression:


* End stage renal disease on HD MWF


* COVID 19 PNA


* Hypertension


* Type II DM


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


* Secondary hyperparathyroidism





Plan:


* No acute need for HD today


* Continue MWF schedule - UF as tolerated


* Dose medications for renal function


* Renal diet


* Epogen TIW prn


* AM labs ordered





Subjective


Date of service: 11/15/20


Principal diagnosis: Bilateral lung PNA


Interval history: 





Chart, vitals, labs reviewed.





Objective





- Exam


Narrative Exam: 





In effort of PPE conservation strategy and to reduce transmission of COVID 19, 

physical exam deferred.





- Vital Signs


Vital signs: 


                               Vital Signs - 12hr











  11/15/20





  11:32


 


Pulse Rate 87


 


Blood Pressure 145/83














- Lab





                                 11/14/20 06:12





                                 11/14/20 06:12


                             Most recent lab results











Calcium  8.7 mg/dL (8.4-10.2)   11/14/20  06:12    














Medications & Allergies





- Medications


Allergies/Adverse Reactions: 


                                    Allergies





shellfish derived Allergy (Verified 10/22/19 07:36)


   Anaphylaxis








Home Medications: 


                                Home Medications











 Medication  Instructions  Recorded  Confirmed  Last Taken  Type


 


Insulin Aspart (Nf) [Novolog] 7 units SQ TID 10/22/19 11/13/20 10/21/19 17:00 

History


 


Metoclopramide [Reglan] 10 mg PO TID 30 Days #40 tab 10/22/19 11/13/20 Unknown 

Rx


 


Pantoprazole [Protonix] 40 mg PO QDAY 30 Days #30 tablet 10/22/19 11/13/20 

Unknown Rx


 


cloNIDine [Catapres] 0.1 mg PO DAILY 10/22/19 11/13/20 10/22/19 06:00 History











Active Medications: 














Generic Name Dose Route Start Last Admin





  Trade Name Freq  PRN Reason Stop Dose Admin


 


Acetaminophen  650 mg  11/13/20 12:00 





  Tylenol  PO  





  Q4H PRN  





  Pain MILD(1-3)/Fever >100.5/HA  


 


Albuterol  2.5 mg  11/13/20 12:00 





  Proventil  IH  





  Q4HRT PRN  





  Shortness Of Breath  


 


Ascorbic Acid  500 mg  11/15/20 11:00  11/15/20 11:17





  Vitamin C  PO   500 mg





  QDAY CHRIS   Administration


 


Atorvastatin Calcium  80 mg  11/14/20 22:00  11/14/20 21:17





  Lipitor  PO   80 mg





  QHS CHRIS   Administration


 


Azithromycin  500 mg  11/14/20 10:00  11/15/20 11:17





  Zithromax  PO   500 mg





  QDAY CHRIS   Administration


 


Calcitriol  0.25 mcg  11/14/20 14:30  11/15/20 11:17





  Rocaltrol  PO   0.25 mcg





  QDAY CHRIS   Administration


 


Carvedilol  12.5 mg  11/14/20 14:00  11/15/20 11:17





  Coreg  PO   12.5 mg





  BID CHRIS   Administration


 


Clonidine HCl  0.1 mg  11/14/20 10:00  11/15/20 11:32





  Catapres  PO   0.1 mg





  DAILY CHRIS   Administration


 


Heparin Sodium (Porcine)  5,000 unit  11/13/20 22:00  11/15/20 11:17





  Heparin  SUB-Q   5,000 unit





  Q12HR CHRIS   Administration


 


Hydralazine HCl  5 mg  11/14/20 10:16 





  Apresoline  IV  





  Q4H PRN  





  Hypertension  


 


Ceftriaxone Sodium  2 gm in 100 mls @ 200 mls/hr  11/14/20 10:00  11/15/20 11:17





  Rocephin/Ns 2 Gm/100 Ml  IV  11/15/20 23:59  200 mls/hr





  Q24HR CHRIS   Administration





  Protocol  


 


Sodium Chloride  100 mls @ 999 mls/hr  11/13/20 11:40 





  Nacl 0.9%  IV  





  ANALY PRN  





  Hypotension  


 


Insulin Human Lispro  7 unit  11/13/20 18:30  11/15/20 14:12





  Humalog  SUB-Q   Not Given





  TIDAC CHRIS  


 


Lactulose  20 gm  11/14/20 10:16 





  Cephulac  PO  





  QDAY PRN  





  Constipation  


 


Methylprednisolone Sodium Succinate  40 mg  11/13/20 22:00  11/15/20 14:46





  Solu-Medrol  IV   40 mg





  Q8HR CHRIS   Administration


 


Metoclopramide HCl  5 mg  11/13/20 20:00  11/15/20 14:46





  Reglan  PO   5 mg





  TID CHRIS   Administration


 


Nifedipine  60 mg  11/14/20 14:00  11/15/20 11:17





  Procardia Xl  PO   60 mg





  QDAY CHRIS   Administration


 


Ondansetron HCl  4 mg  11/13/20 12:00 





  Zofran  IV  





  Q8H PRN  





  Nausea And Vomiting  


 


Pantoprazole Sodium  40 mg  11/14/20 10:00  11/15/20 11:17





  Protonix  PO   40 mg





  QDAY CHRIS   Administration


 


Sevelamer Carbonate  800 mg  11/14/20 16:30  11/15/20 14:11





  Renvela  PO   Not Given





  AC CHRIS  


 


Sodium Chloride  10 ml  11/13/20 22:00  11/15/20 11:18





  Sodium Chloride Flush Syringe 10 Ml  IV   10 ml





  BID CHRIS   Administration


 


Sodium Chloride  10 ml  11/13/20 12:00 





  Sodium Chloride Flush Syringe 10 Ml  IV  





  PRN PRN  





  LINE FLUSH  


 


Trazodone HCl  50 mg  11/14/20 22:00  11/14/20 21:17





  Desyrel  PO   50 mg





  QHS CHRIS   Administration


 


Zinc Sulfate  220 mg  11/15/20 11:00  11/15/20 11:18





  Zinc Sulfate  PO   220 mg





  QDAY CHRIS   Administration

## 2020-11-15 NOTE — PROGRESS NOTE
Assessment and Plan








- Patient Problems


(1) Pneumonia likely 2/2 of covid virus infection


Current Visit: Yes   Status: Acute   


Plan to address problem: 


ontinue IV abx therapy azithromycin and Rocephine


AM lab CBC, BMP,  supplemental oxygen and  nebulizer therapy,


blood culture, supportive care.








(2) ESRD needing dialysis


Current Visit: Yes   Status: Acute   


Plan to address problem: 


HD per Nephrology


avoid nephrotoxic agents








(3) 2019 novel coronavirus infection-positive


Current Visit: Yes   Status: Acute   


Plan to address problem: 


Coronavirus PCR positive


Continue IV steroid therapy, contact isolation, respiratory isolation, 

supportive care.


Consulted [id number]/u with recommendation


Monitor inflammatory [id number], CRP, LDH, and ferritin and procalcitonin


Patient seen on room air-not in distress








(4) Hypertension


Current Visit: Yes   Status: Acute   


Qualifiers: 


   Hypertension type: essential hypertension   Qualified Code(s): I10 - 

Essential (primary) hypertension   


Plan to address problem: 


Monitor blood pressure every shift,


continue medical management.


PRN hydralazine








(5) Diabetes


Current Visit: Yes   Status: Acute   


Plan to address problem: 


Monitor blood sugar with SSI


Advised to avoid concentrated high carbohydrate diet,


hypoglycemia protocol.








(6) GERD (gastroesophageal reflux disease)


Current Visit: Yes   Status: Acute   


Qualifiers: 


   Esophagitis presence: without esophagitis   Qualified Code(s): K21.9 - 

Gastro-esophageal reflux disease without esophagitis   


Plan to address problem: 


PPI therapy,








(7) DVT prophylaxis


Current Visit: Yes   Status: Acute   


Plan to address problem: 


SCD to bilateral lower extremities while in bed, prophylactic anticoagulation








Subjective


Date of service: 11/15/20


Principal diagnosis: Bilateral lung PNA


Interval history: 





Patient seen at bedside-on room air, he denies any acute distress


Reviewed lab, mar, and v/s


Covid test positive-ID consulted


Patient asymptomatic-with covid syndrome


Elevated inflammatory maker-trend





Objective





- Constitutional


Vitals: 


                               Vital Signs - 12hr











  11/14/20 11/14/20





  22:33 23:00


 


Temperature 98.3 F 


 


Pulse Rate  75


 


Respiratory 18 





Rate  


 


Blood Pressure 157/90 


 


O2 Sat by Pulse  98





Oximetry  











General appearance: Present: no acute distress, well-nourished





- EENT


Eyes: PERRL, EOM intact


ENT: hearing intact, clear oral mucosa


Ears: bilateral: normal





- Neck


Neck: supple, normal ROM





- Respiratory


Details: 





Patient on room air-calm and no distress


Respiratory effort: normal


Respiratory: bilateral: CTA





- Breasts


Breasts: normal





- Cardiovascular


Heart rate: 87


Rhythm: regular


Heart Sounds: Present: S1 & S2.  Absent: gallop, rub


Extremities: pulses intact, No edema, normal color, Full ROM, abnormal (BKA of 

left leg)





- Gastrointestinal


General gastrointestinal: Present: soft, non-tender, non-distended, normal bowel

sounds





- Genitourinary


Male genitourinary: normal





- Integumentary


Integumentary: clear, warm, dry





- Musculoskeletal


Musculoskeletal: strength equal bilaterally, other (BKA of left leg)





- Neurologic


Neurologic: moves all extremities





- Psychiatric


Psychiatric: memory intact, appropriate mood/affect, intact judgment & insight





- Allied health notes


Allied health notes reviewed: nursing





- Labs


CBC & Chem 7: 


                                 11/14/20 06:12





                                 11/14/20 06:12


Labs: 


                              Abnormal lab results











  11/14/20 11/14/20 11/14/20 Range/Units





  10:21 12:25 17:43 


 


POC Glucose   230 H  210 H  ()  mg/dL


 


Coronavirus (PCR)  Positive A    (Negative)  














  11/14/20 Range/Units





  22:49 


 


POC Glucose  207 H  ()  mg/dL


 


Coronavirus (PCR)   (Negative)

## 2020-11-16 LAB
BASOPHILS # (AUTO): 0 K/MM3 (ref 0–0.1)
BASOPHILS NFR BLD AUTO: 0.1 % (ref 0–1.8)
BUN SERPL-MCNC: 101 MG/DL (ref 9–20)
BUN/CREAT SERPL: 7 %
CALCIUM SERPL-MCNC: 8.8 MG/DL (ref 8.4–10.2)
CRP SERPL-MCNC: 3.5 MG/DL (ref 0–1.3)
EOSINOPHIL # BLD AUTO: 0 K/MM3 (ref 0–0.4)
EOSINOPHIL NFR BLD AUTO: 0 % (ref 0–4.3)
HCT VFR BLD CALC: 29.6 % (ref 35.5–45.6)
HEMOLYSIS INDEX: 4
HGB BLD-MCNC: 9.5 GM/DL (ref 11.8–15.2)
LYMPHOCYTES # BLD AUTO: 0.6 K/MM3 (ref 1.2–5.4)
LYMPHOCYTES NFR BLD AUTO: 8.5 % (ref 13.4–35)
MCHC RBC AUTO-ENTMCNC: 32 % (ref 32–34)
MCV RBC AUTO: 79 FL (ref 84–94)
MONOCYTES # (AUTO): 0.4 K/MM3 (ref 0–0.8)
MONOCYTES % (AUTO): 5.2 % (ref 0–7.3)
PLATELET # BLD: 288 K/MM3 (ref 140–440)
RBC # BLD AUTO: 3.75 M/MM3 (ref 3.65–5.03)

## 2020-11-16 PROCEDURE — 5A1D70Z PERFORMANCE OF URINARY FILTRATION, INTERMITTENT, LESS THAN 6 HOURS PER DAY: ICD-10-PCS | Performed by: INTERNAL MEDICINE

## 2020-11-16 RX ADMIN — OXYCODONE HYDROCHLORIDE AND ACETAMINOPHEN SCH: 500 TABLET ORAL at 13:03

## 2020-11-16 RX ADMIN — METHYLPREDNISOLONE SODIUM SUCCINATE SCH MG: 40 INJECTION, POWDER, FOR SOLUTION INTRAMUSCULAR; INTRAVENOUS at 05:31

## 2020-11-16 RX ADMIN — METOCLOPRAMIDE SCH MG: 10 TABLET ORAL at 21:18

## 2020-11-16 RX ADMIN — INSULIN LISPRO SCH UNIT: 100 INJECTION, SOLUTION INTRAVENOUS; SUBCUTANEOUS at 18:50

## 2020-11-16 RX ADMIN — DEXAMETHASONE SCH MG: 4 TABLET ORAL at 18:50

## 2020-11-16 RX ADMIN — SEVELAMER CARBONATE SCH MG: 800 TABLET, FILM COATED ORAL at 18:50

## 2020-11-16 RX ADMIN — HEPARIN SODIUM SCH: 5000 INJECTION, SOLUTION INTRAVENOUS; SUBCUTANEOUS at 13:01

## 2020-11-16 RX ADMIN — CALCITRIOL SCH: 0.25 CAPSULE ORAL at 13:02

## 2020-11-16 RX ADMIN — SEVELAMER CARBONATE SCH: 800 TABLET, FILM COATED ORAL at 13:03

## 2020-11-16 RX ADMIN — Medication SCH: at 13:03

## 2020-11-16 RX ADMIN — INSULIN LISPRO SCH UNIT: 100 INJECTION, SOLUTION INTRAVENOUS; SUBCUTANEOUS at 12:26

## 2020-11-16 RX ADMIN — INSULIN LISPRO SCH: 100 INJECTION, SOLUTION INTRAVENOUS; SUBCUTANEOUS at 13:00

## 2020-11-16 RX ADMIN — AZITHROMYCIN SCH: 250 TABLET, FILM COATED ORAL at 13:03

## 2020-11-16 RX ADMIN — METOCLOPRAMIDE SCH MG: 10 TABLET ORAL at 08:47

## 2020-11-16 RX ADMIN — METHYLPREDNISOLONE SODIUM SUCCINATE SCH: 40 INJECTION, POWDER, FOR SOLUTION INTRAMUSCULAR; INTRAVENOUS at 15:22

## 2020-11-16 RX ADMIN — METOCLOPRAMIDE SCH: 10 TABLET ORAL at 15:21

## 2020-11-16 RX ADMIN — NIFEDIPINE SCH: 60 TABLET, EXTENDED RELEASE ORAL at 13:02

## 2020-11-16 RX ADMIN — PANTOPRAZOLE SODIUM SCH: 40 TABLET, DELAYED RELEASE ORAL at 13:02

## 2020-11-16 RX ADMIN — Medication SCH: at 13:02

## 2020-11-16 RX ADMIN — Medication SCH ML: at 21:19

## 2020-11-16 RX ADMIN — HEPARIN SODIUM SCH UNIT: 5000 INJECTION, SOLUTION INTRAVENOUS; SUBCUTANEOUS at 21:19

## 2020-11-16 RX ADMIN — SEVELAMER CARBONATE SCH MG: 800 TABLET, FILM COATED ORAL at 08:47

## 2020-11-16 NOTE — CONSULTATION
History of Present Illness





- Reason for Consult


Consult date: 11/16/20


COVID


Requesting physician: CHICO CABALLERO





- History of Present Illness





The patient is a 50-year-old male with ESRD on HD, diabetes, hypertension 

admitted to the hospital for feeling sick, fevers, weakness, malaise and 

episodes of vomiting.  Patient had a chest x-ray showing bilateral pneumonia.  

Was admitted to the hospital.  Infectious diseases was consulted for additional 

evaluation.





Labs show normal WBC, creatinine today is 14.5, COVID-19 PCR is positive.  

Procalcitonin 0.65, CRP 3.5





Review of Systems: 


reviewed in the chart, unable to obtain directly due to PPE preservation and 

minimize risk of transmission





Past History


Past Medical History: diabetes, ESRD, hypertension


Past Surgical History: Other (AV access creation)


Social history: .  denies: smoking, alcohol abuse, prescription drug 

abuse


Family history: no significant family history





Medications and Allergies


                                    Allergies











Allergy/AdvReac Type Severity Reaction Status Date / Time


 


shellfish derived Allergy  Anaphylaxis Verified 10/22/19 07:36











                                Home Medications











 Medication  Instructions  Recorded  Confirmed  Last Taken  Type


 


Insulin Aspart (Nf) [Novolog] 7 units SQ TID 10/22/19 11/13/20 10/21/19 17:00 

History


 


Metoclopramide [Reglan] 10 mg PO TID 30 Days #40 tab 10/22/19 11/13/20 Unknown 

Rx


 


Pantoprazole [Protonix] 40 mg PO QDAY 30 Days #30 tablet 10/22/19 11/13/20 

Unknown Rx


 


cloNIDine [Catapres] 0.1 mg PO DAILY 10/22/19 11/13/20 10/22/19 06:00 History











Active Meds: 


Active Medications





Acetaminophen (Tylenol)  650 mg PO Q4H PRN


   PRN Reason: Pain MILD(1-3)/Fever >100.5/HA


Albuterol (Proventil)  2.5 mg IH Q4HRT PRN


   PRN Reason: Shortness Of Breath


Ascorbic Acid (Vitamin C)  500 mg PO QDAY [facility]


   Last Admin: 11/16/20 13:03 Dose:  Not Given


   Documented by: 


Atorvastatin Calcium (Lipitor)  80 mg PO QHS [facility]


   Last Admin: 11/15/20 22:14 Dose:  80 mg


   Documented by: 


Azithromycin (Zithromax)  500 mg PO QDAY [facility]


   Stop: 11/17/20 10:01


   Last Admin: 11/16/20 13:03 Dose:  Not Given


   Documented by: 


Calcitriol (Rocaltrol)  0.25 mcg PO QDAY [facility]


   Last Admin: 11/16/20 13:02 Dose:  Not Given


   Documented by: 


Carvedilol (Coreg)  12.5 mg PO BID [facility]


   Last Admin: 11/16/20 13:01 Dose:  Not Given


   Documented by: 


Clonidine HCl (Catapres)  0.1 mg PO DAILY [facility]


   Last Admin: 11/16/20 13:01 Dose:  Not Given


   Documented by: 


Heparin Sodium (Porcine) (Heparin)  5,000 unit SUB-Q Q12HR [facility]


   Last Admin: 11/16/20 13:01 Dose:  Not Given


   Documented by: 


Hydralazine HCl (Apresoline)  5 mg IV Q4H PRN


   PRN Reason: Hypertension


Sodium Chloride (Nacl 0.9%)  100 mls @ 999 mls/hr IV ANALY PRN


   PRN Reason: Hypotension


Insulin Human Lispro (Humalog)  7 unit SUB-Q TIDAC [facility]


   Last Admin: 11/16/20 13:00 Dose:  Not Given


   Documented by: 


Lactulose (Cephulac)  20 gm PO QDAY PRN


   PRN Reason: Constipation


Methylprednisolone Sodium Succinate (Solu-Medrol)  40 mg IV Q8HR [facility]


   Last Admin: 11/16/20 15:22 Dose:  Not Given


   Documented by: 


Metoclopramide HCl (Reglan)  5 mg PO TID [facility]


   Last Admin: 11/16/20 15:21 Dose:  Not Given


   Documented by: 


Nifedipine (Procardia Xl)  60 mg PO QDAY [facility]


   Last Admin: 11/16/20 13:02 Dose:  Not Given


   Documented by: 


Ondansetron HCl (Zofran)  4 mg IV Q8H PRN


   PRN Reason: Nausea And Vomiting


Pantoprazole Sodium (Protonix)  40 mg PO QDAY [facility]


   Last Admin: 11/16/20 13:02 Dose:  Not Given


   Documented by: 


Sevelamer Carbonate (Renvela)  800 mg PO AC [facility]


   Last Admin: 11/16/20 13:03 Dose:  Not Given


   Documented by: 


Sodium Chloride (Sodium Chloride Flush Syringe 10 Ml)  10 ml IV BID [facility]


   Last Admin: 11/16/20 13:02 Dose:  Not Given


   Documented by: 


Sodium Chloride (Sodium Chloride Flush Syringe 10 Ml)  10 ml IV PRN PRN


   PRN Reason: LINE FLUSH


Trazodone HCl (Desyrel)  50 mg PO QHS [facility]


   Last Admin: 11/15/20 22:14 Dose:  50 mg


   Documented by: 


Zinc Sulfate (Zinc Sulfate)  220 mg PO QDAY [facility]


   Last Admin: 11/16/20 13:03 Dose:  Not Given


   Documented by: 











Physical Examination





- Physical Exam


Narrative exam: 





Physical Exam (reviewed in chart due to PPE conservation and minimize risk of 

transmission)


Constitutional: limited due to PPE conservation strategy


Head, Ears, Nose: limited due to PPE conservation strategy


Eyes: limited due to PPE conservation strategy


Neck: limited due to PPE conservation strategy


Oral: limited due to PPE conservation strategy


Cardiovascular: limited due to PPE conservation strategy


Respiratory: limited due to PPE conservation strategy


GI: limited due to PPE conservation strategy


Musculoskeletal: limited due to PPE conservation strategy


Skin: limited due to PPE conservation strategy


Hem/Lymphatic: limited due to PPE conservation strategy


Psych: limited due to PPE conservation strategy


Neurological: limited due to PPE conservation strategy





- Constitutional


Vitals: 


                                   Vital Signs











Temp Pulse Resp BP Pulse Ox


 


 98.1 F   81   18   129/70   93 


 


 11/16/20 15:16  11/16/20 15:16  11/16/20 15:16  11/16/20 15:16  11/16/20 15:16








                           Temperature -Last 24 Hours











Temperature                    98.1 F


 


Temperature                    98.1 F


 


Temperature                    98.1 F


 


Temperature                    98.1 F


 


Temperature                    98.5 F

















Results





- Labs


CBC & Chem 7: 


                                 11/16/20 11:08





                                 11/16/20 11:08


Labs: 


                              Abnormal lab results











  11/15/20 11/16/20 11/16/20 Range/Units





  16:59 08:38 11:08 


 


Hgb    9.5 L  (11.8-15.2)  gm/dl


 


Hct    29.6 L  (35.5-45.6)  %


 


MCV    79 L  (84-94)  fl


 


MCH    25 L  (28-32)  pg


 


RDW    17.5 H  (13.2-15.2)  %


 


Lymph % (Auto)    8.5 L  (13.4-35.0)  %


 


Lymph # (Auto)    0.6 L  (1.2-5.4)  K/mm3


 


Seg Neutrophils %    86.2 H  (40.0-70.0)  %


 


D-Dimer     (0-234)  ng/mlDDU


 


Sodium     (137-145)  mmol/L


 


Potassium     (3.6-5.0)  mmol/L


 


Chloride     ()  mmol/L


 


BUN     (9-20)  mg/dL


 


Creatinine     (0.8-1.3)  mg/dL


 


Glucose     ()  mg/dL


 


POC Glucose  313 H  382 H   ()  mg/dL


 


Ferritin     (30.0-300.0)  ng/mL


 


Lactate Dehydrogenase     ()  units/L


 


C-Reactive Protein     (0.00-1.30)  mg/dL














  11/16/20 11/16/20 11/16/20 Range/Units





  11:08 11:08 11:08 


 


Hgb     (11.8-15.2)  gm/dl


 


Hct     (35.5-45.6)  %


 


MCV     (84-94)  fl


 


MCH     (28-32)  pg


 


RDW     (13.2-15.2)  %


 


Lymph % (Auto)     (13.4-35.0)  %


 


Lymph # (Auto)     (1.2-5.4)  K/mm3


 


Seg Neutrophils %     (40.0-70.0)  %


 


D-Dimer  294.11 H    (0-234)  ng/mlDDU


 


Sodium    131 L  (137-145)  mmol/L


 


Potassium    5.3 H  (3.6-5.0)  mmol/L


 


Chloride    85.2 L  ()  mmol/L


 


BUN    101 H  (9-20)  mg/dL


 


Creatinine    14.5 H  (0.8-1.3)  mg/dL


 


Glucose    513 H*  ()  mg/dL


 


POC Glucose     ()  mg/dL


 


Ferritin   779.4 H   (30.0-300.0)  ng/mL


 


Lactate Dehydrogenase    228 H  ()  units/L


 


C-Reactive Protein    3.50 H  (0.00-1.30)  mg/dL














  11/16/20 Range/Units





  14:09 


 


Hgb   (11.8-15.2)  gm/dl


 


Hct   (35.5-45.6)  %


 


MCV   (84-94)  fl


 


MCH   (28-32)  pg


 


RDW   (13.2-15.2)  %


 


Lymph % (Auto)   (13.4-35.0)  %


 


Lymph # (Auto)   (1.2-5.4)  K/mm3


 


Seg Neutrophils %   (40.0-70.0)  %


 


D-Dimer   (0-234)  ng/mlDDU


 


Sodium   (137-145)  mmol/L


 


Potassium   (3.6-5.0)  mmol/L


 


Chloride   ()  mmol/L


 


BUN   (9-20)  mg/dL


 


Creatinine   (0.8-1.3)  mg/dL


 


Glucose   ()  mg/dL


 


POC Glucose  319 H  ()  mg/dL


 


Ferritin   (30.0-300.0)  ng/mL


 


Lactate Dehydrogenase   ()  units/L


 


C-Reactive Protein   (0.00-1.30)  mg/dL














- Imaging and Cardiology


Chest x-ray: report reviewed, image reviewed (patchy airspace disease b/l)





Assessment and Plan





Cultures:


Coronavirus PCR: Positive


Blood culture 11/14/2020: No growth





A/P:


50-year-old male with ESRD on HD, diabetes, hypertension:





#Bilateral pneumonia: Secondary to COVID-19





#Acute hypoxic respiratory failure: Initially on room air, now saturating 93% on

 3 L by nasal cannula.





#ESRD: On HD





#Diabetes mellitus, uncontrolled





Recs:


IV/PO Dexamethasone 6 mg daily x 10 days


Not a candidate for remdesivir due to renal failure


prophylactic anticoagulation based on d-dimer


trend ferritin, LDH, d-dimer, CRP every 2-3 days for risk stratification and to 

assess disease progression


Procalcitonin elevation likely due to renal failure, no leukocytosis, no fever. 

 Antibiotics discontinued








Kenneth Shoemaker MD, FACP


[facility] ([facility])


O: [phone]


F: [phone]

## 2020-11-16 NOTE — PROGRESS NOTE
Assessment and Plan





Impression:


* End stage renal disease on HD MWF


* COVID 19 PNA


* Hypertension


* Type II DM


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


* Secondary hyperparathyroidism





Plan:


* Continue MWF schedule - UF as tolerated


* Dose medications for renal function


* COVID treatment per primary team


* Renal diet


* Epogen TIW prn


* AM labs ordered





Subjective


Date of service: 11/16/20


Principal diagnosis: Bilateral lung PNA


Interval history: 





chart reviewed, events noted





Objective





- Exam


Narrative Exam: 











In effort of PPE conservation strategy and to reduce transmission of COVID 19, 

physical exam deferred.





- Vital Signs


Vital signs: 


                               Vital Signs - 12hr











  11/15/20 11/15/20 11/16/20





  22:08 22:10 00:00


 


Temperature 98.1 F 98.1 F 


 


Pulse Rate 85  


 


Respiratory 16 16 20





Rate   


 


Blood Pressure  149/89 


 


Blood Pressure 149/89  





[Right]   


 


O2 Sat by Pulse 95  





Oximetry   














- Lab





                                 11/14/20 06:12





                                 11/14/20 06:12


                             Most recent lab results











Calcium  8.7 mg/dL (8.4-10.2)   11/14/20  06:12    














Medications & Allergies





- Medications


Allergies/Adverse Reactions: 


                                    Allergies





shellfish derived Allergy (Verified 10/22/19 07:36)


   Anaphylaxis








Home Medications: 


                                Home Medications











 Medication  Instructions  Recorded  Confirmed  Last Taken  Type


 


Insulin Aspart (Nf) [Novolog] 7 units SQ TID 10/22/19 11/13/20 10/21/19 17:00 

History


 


Metoclopramide [Reglan] 10 mg PO TID 30 Days #40 tab 10/22/19 11/13/20 Unknown R

x


 


Pantoprazole [Protonix] 40 mg PO QDAY 30 Days #30 tablet 10/22/19 11/13/20 

Unknown Rx


 


cloNIDine [Catapres] 0.1 mg PO DAILY 10/22/19 11/13/20 10/22/19 06:00 History











Active Medications: 














Generic Name Dose Route Start Last Admin





  Trade Name Freq  PRN Reason Stop Dose Admin


 


Acetaminophen  650 mg  11/13/20 12:00 





  Tylenol  PO  





  Q4H PRN  





  Pain MILD(1-3)/Fever >100.5/HA  


 


Albuterol  2.5 mg  11/13/20 12:00 





  Proventil  IH  





  Q4HRT PRN  





  Shortness Of Breath  


 


Ascorbic Acid  500 mg  11/15/20 11:00  11/15/20 11:17





  Vitamin C  PO   500 mg





  QDAY CHRIS   Administration


 


Atorvastatin Calcium  80 mg  11/14/20 22:00  11/15/20 22:14





  Lipitor  PO   80 mg





  QHS CHRIS   Administration


 


Azithromycin  500 mg  11/14/20 10:00  11/15/20 11:17





  Zithromax  PO   500 mg





  QDAY CHRIS   Administration


 


Calcitriol  0.25 mcg  11/14/20 14:30  11/15/20 11:17





  Rocaltrol  PO   0.25 mcg





  QDAY CHRIS   Administration


 


Carvedilol  12.5 mg  11/14/20 14:00  11/15/20 22:14





  Coreg  PO   12.5 mg





  BID CHRIS   Administration


 


Clonidine HCl  0.1 mg  11/14/20 10:00  11/15/20 11:32





  Catapres  PO   0.1 mg





  DAILY CHRIS   Administration


 


Heparin Sodium (Porcine)  5,000 unit  11/13/20 22:00  11/15/20 22:15





  Heparin  SUB-Q   5,000 unit





  Q12HR CHRIS   Administration


 


Hydralazine HCl  5 mg  11/14/20 10:16 





  Apresoline  IV  





  Q4H PRN  





  Hypertension  


 


Sodium Chloride  100 mls @ 999 mls/hr  11/13/20 11:40 





  Nacl 0.9%  IV  





  ANALY PRN  





  Hypotension  


 


Insulin Human Lispro  7 unit  11/13/20 18:30  11/15/20 17:35





  Humalog  SUB-Q   7 unit





  TIDAC CHRIS   Administration


 


Lactulose  20 gm  11/14/20 10:16 





  Cephulac  PO  





  QDAY PRN  





  Constipation  


 


Methylprednisolone Sodium Succinate  40 mg  11/13/20 22:00  11/16/20 05:31





  Solu-Medrol  IV   40 mg





  Q8HR CHRIS   Administration


 


Metoclopramide HCl  5 mg  11/13/20 20:00  11/16/20 08:47





  Reglan  PO   5 mg





  TID CHRIS   Administration


 


Nifedipine  60 mg  11/14/20 14:00  11/15/20 11:17





  Procardia Xl  PO   60 mg





  QDAY CHRIS   Administration


 


Ondansetron HCl  4 mg  11/13/20 12:00 





  Zofran  IV  





  Q8H PRN  





  Nausea And Vomiting  


 


Pantoprazole Sodium  40 mg  11/14/20 10:00  11/15/20 11:17





  Protonix  PO   40 mg





  QDAY CHRIS   Administration


 


Sevelamer Carbonate  800 mg  11/14/20 16:30  11/16/20 08:47





  Renvela  PO   800 mg





  AC CHRIS   Administration


 


Sodium Chloride  10 ml  11/13/20 22:00  11/15/20 22:15





  Sodium Chloride Flush Syringe 10 Ml  IV   10 ml





  BID CHRIS   Administration


 


Sodium Chloride  10 ml  11/13/20 12:00 





  Sodium Chloride Flush Syringe 10 Ml  IV  





  PRN PRN  





  LINE FLUSH  


 


Trazodone HCl  50 mg  11/14/20 22:00  11/15/20 22:14





  Desyrel  PO   50 mg





  QHS CHRIS   Administration


 


Zinc Sulfate  220 mg  11/15/20 11:00  11/15/20 11:18





  Zinc Sulfate  PO   220 mg





  QDAY CHRIS   Administration

## 2020-11-16 NOTE — PROGRESS NOTE
Assessment and Plan


Assessment and plan: 





Pneumonia likely 2/2 of covid virus infection


Continue IV abx therapy azithromycin and Rocephine


AM lab CBC, BMP,  supplemental oxygen and  nebulizer therapy,


blood culture, supportive care.





ESRD needing dialysis


HD per Nephrology


avoid nephrotoxic agents





2019 novel coronavirus infection-positive


Coronavirus PCR positive


Continue IV steroid therapy, contact isolation, respiratory isolation, 

supportive care.


Consulted ID-f/u with recommendation


Monitor inflammatory [id number], CRP, LDH, and ferritin and procalcitonin


Patient seen on room air-not in distress





Hypertension


Monitor blood pressure every shift,


continue medical management.


PRN hydralazine





Diabetes


Monitor blood sugar with SSI


Advised to avoid concentrated high carbohydrate diet,


hypoglycemia protocol.





GERD (gastroesophageal reflux disease)


PPI therapy,





DVT prophylaxis


SCD to bilateral lower extremities while in bed, prophylactic anticoagulation





History


Interval history: 





No new issues overnight.





Hospitalist Physical





- Constitutional


Vitals: 


                                        











Temp Pulse Resp BP Pulse Ox


 


 98.1 F   85   20   149/89   95 


 


 11/15/20 22:10  11/15/20 22:08  11/16/20 00:00  11/15/20 22:10  11/15/20 22:08











General appearance: Present: no acute distress, well-nourished





- EENT


Eyes: Present: PERRL, EOM intact


ENT: hearing intact, clear oral mucosa, dentition normal





- Neck


Neck: Present: supple, normal ROM





- Respiratory


Respiratory effort: normal


Respiratory: bilateral: CTA





- Cardiovascular


Rhythm: regular


Heart Sounds: Present: S1 & S2.  Absent: gallop, rub





- Extremities


Extremities: no ischemia, No edema, Full ROM





- Abdominal


General gastrointestinal: soft, non-tender, non-distended, normal bowel sounds





- Integumentary


Integumentary: Present: clear, warm, dry





- Neurologic


Neurologic: CNII-XII intact, moves all extremities





Results





- Labs


CBC & Chem 7: 


                                 11/14/20 06:12





                                 11/14/20 06:12


Labs: 


                             Laboratory Last Values











WBC  4.4 K/mm3 (4.5-11.0)  L  11/14/20  06:12    


 


RBC  3.83 M/mm3 (3.65-5.03)   11/14/20  06:12    


 


Hgb  9.8 gm/dl (11.8-15.2)  L  11/14/20  06:12    


 


Hct  30.3 % (35.5-45.6)  L  11/14/20  06:12    


 


MCV  79 fl (84-94)  L  11/14/20  06:12    


 


MCH  26 pg (28-32)  L  11/14/20  06:12    


 


MCHC  32 % (32-34)   11/14/20  06:12    


 


RDW  17.7 % (13.2-15.2)  H  11/14/20  06:12    


 


Plt Count  224 K/mm3 (140-440)   11/14/20  06:12    


 


Lymph % (Auto)  29.6 % (13.4-35.0)   11/14/20  06:12    


 


Mono % (Auto)  10.9 % (0.0-7.3)  H  11/14/20  06:12    


 


Eos % (Auto)  0.0 % (0.0-4.3)   11/14/20  06:12    


 


Baso % (Auto)  0.8 % (0.0-1.8)   11/14/20  06:12    


 


Lymph # (Auto)  1.3 K/mm3 (1.2-5.4)   11/14/20  06:12    


 


Mono # (Auto)  0.5 K/mm3 (0.0-0.8)   11/14/20  06:12    


 


Eos # (Auto)  0.0 K/mm3 (0.0-0.4)   11/14/20  06:12    


 


Baso # (Auto)  0.0 K/mm3 (0.0-0.1)   11/14/20  06:12    


 


Seg Neutrophils %  58.7 % (40.0-70.0)   11/14/20  06:12    


 


Seg Neutrophils #  2.6 K/mm3 (1.8-7.7)   11/14/20  06:12    


 


D-Dimer  358.70 ng/mlDDU (0-234)  H  11/15/20  10:33    


 


Sodium  136 mmol/L (137-145)  L  11/14/20  06:12    


 


Potassium  4.9 mmol/L (3.6-5.0)   11/14/20  06:12    


 


Chloride  92.1 mmol/L ()  L  11/14/20  06:12    


 


Carbon Dioxide  31 mmol/L (22-30)  H  11/14/20  06:12    


 


Anion Gap  18 mmol/L  11/14/20  06:12    


 


BUN  44 mg/dL (9-20)  H  11/14/20  06:12    


 


Creatinine  10.0 mg/dL (0.8-1.3)  H  11/14/20  06:12    


 


Estimated GFR  7 ml/min  11/14/20  06:12    


 


BUN/Creatinine Ratio  4 %  11/14/20  06:12    


 


Glucose  142 mg/dL ()  H  11/14/20  06:12    


 


POC Glucose  382 mg/dL ()  H  11/16/20  08:38    


 


Calcium  8.7 mg/dL (8.4-10.2)   11/14/20  06:12    


 


Ferritin  897.7 ng/mL (30.0-300.0)  H  11/15/20  10:33    


 


Total Bilirubin  0.20 mg/dL (0.1-1.2)   11/13/20  09:03    


 


AST  19 units/L (5-40)   11/13/20  09:03    


 


ALT  6 units/L (7-56)  L  11/13/20  09:03    


 


Alkaline Phosphatase  61 units/L ()   11/13/20  09:03    


 


Lactate Dehydrogenase  259 units/L ()  H  11/15/20  10:33    


 


C-Reactive Protein  6.00 mg/dL (0.00-1.30)  H  11/15/20  10:33    


 


Total Protein  7.0 g/dL (6.3-8.2)   11/13/20  09:03    


 


Albumin  3.5 g/dL (3.9-5)  L  11/13/20  09:03    


 


Albumin/Globulin Ratio  1.0 %  11/13/20  09:03    


 


Procalcitonin  0.94 ng/mL (<0.15)   11/15/20  10:33    


 


Coronavirus (PCR)  Positive  (Negative)  A  11/14/20  10:21    


 


Hepatitis A IgM Ab  Non-reactive  (NonReactive)   11/13/20  11:57    


 


Hep Bs Antigen  Non-reactive  (Negative)   11/13/20  11:57    


 


Hep B Core IgM Ab  Non-reactive  (NonReactive)   11/13/20  11:57    


 


Hepatitis C Antibody  Non-reactive  (NonReactive)   11/13/20  11:57    











Microbiology: 


Microbiology





11/14/20 13:27   Peripheral/Venous   Blood Culture - Preliminary


                            NO GROWTH AFTER 24 HOURS


11/14/20 14:22   Peripheral/Venous   Blood Culture - Preliminary


                            NO GROWTH AFTER 24 HOURS








Rajput/IV: 


                                        





Voiding Method                   Urinal


IV Catheter Type [Left           INT / Saline Lock


Antecubital]                     











Active Medications





- Current Medications


Current Medications: 














Generic Name Dose Route Start Last Admin





  Trade Name Freq  PRN Reason Stop Dose Admin


 


Acetaminophen  650 mg  11/13/20 12:00 





  Tylenol  PO  





  Q4H PRN  





  Pain MILD(1-3)/Fever >100.5/HA  


 


Albuterol  2.5 mg  11/13/20 12:00 





  Proventil  IH  





  Q4HRT PRN  





  Shortness Of Breath  


 


Ascorbic Acid  500 mg  11/15/20 11:00  11/15/20 11:17





  Vitamin C  PO   500 mg





  QDAY CHRIS   Administration


 


Atorvastatin Calcium  80 mg  11/14/20 22:00  11/15/20 22:14





  Lipitor  PO   80 mg





  QHS CHRIS   Administration


 


Azithromycin  500 mg  11/14/20 10:00  11/15/20 11:17





  Zithromax  PO   500 mg





  QDAY CHRIS   Administration


 


Calcitriol  0.25 mcg  11/14/20 14:30  11/15/20 11:17





  Rocaltrol  PO   0.25 mcg





  QDAY CHRIS   Administration


 


Carvedilol  12.5 mg  11/14/20 14:00  11/15/20 22:14





  Coreg  PO   12.5 mg





  BID CHRIS   Administration


 


Clonidine HCl  0.1 mg  11/14/20 10:00  11/15/20 11:32





  Catapres  PO   0.1 mg





  DAILY CHRIS   Administration


 


Heparin Sodium (Porcine)  5,000 unit  11/13/20 22:00  11/15/20 22:15





  Heparin  SUB-Q   5,000 unit





  Q12HR CHRIS   Administration


 


Hydralazine HCl  5 mg  11/14/20 10:16 





  Apresoline  IV  





  Q4H PRN  





  Hypertension  


 


Sodium Chloride  100 mls @ 999 mls/hr  11/13/20 11:40 





  Nacl 0.9%  IV  





  ANALY PRN  





  Hypotension  


 


Insulin Human Lispro  7 unit  11/13/20 18:30  11/15/20 17:35





  Humalog  SUB-Q   7 unit





  TIDAC CHRIS   Administration


 


Lactulose  20 gm  11/14/20 10:16 





  Cephulac  PO  





  QDAY PRN  





  Constipation  


 


Methylprednisolone Sodium Succinate  40 mg  11/13/20 22:00  11/16/20 05:31





  Solu-Medrol  IV   40 mg





  Q8HR CHRIS   Administration


 


Metoclopramide HCl  5 mg  11/13/20 20:00  11/16/20 08:47





  Reglan  PO   5 mg





  TID CHRIS   Administration


 


Nifedipine  60 mg  11/14/20 14:00  11/15/20 11:17





  Procardia Xl  PO   60 mg





  QDAY CHRIS   Administration


 


Ondansetron HCl  4 mg  11/13/20 12:00 





  Zofran  IV  





  Q8H PRN  





  Nausea And Vomiting  


 


Pantoprazole Sodium  40 mg  11/14/20 10:00  11/15/20 11:17





  Protonix  PO   40 mg





  QDAY CHRIS   Administration


 


Sevelamer Carbonate  800 mg  11/14/20 16:30  11/16/20 08:47





  Renvela  PO   800 mg





  AC CHRIS   Administration


 


Sodium Chloride  10 ml  11/13/20 22:00  11/15/20 22:15





  Sodium Chloride Flush Syringe 10 Ml  IV   10 ml





  BID CHRIS   Administration


 


Sodium Chloride  10 ml  11/13/20 12:00 





  Sodium Chloride Flush Syringe 10 Ml  IV  





  PRN PRN  





  LINE FLUSH  


 


Trazodone HCl  50 mg  11/14/20 22:00  11/15/20 22:14





  Desyrel  PO   50 mg





  QHS CHRIS   Administration


 


Zinc Sulfate  220 mg  11/15/20 11:00  11/15/20 11:18





  Zinc Sulfate  PO   220 mg





  QDAY CHRIS   Administration

## 2020-11-17 LAB
BASOPHILS # (AUTO): 0 K/MM3 (ref 0–0.1)
BASOPHILS NFR BLD AUTO: 0.3 % (ref 0–1.8)
BUN SERPL-MCNC: 68 MG/DL (ref 9–20)
BUN/CREAT SERPL: 6 %
CALCIUM SERPL-MCNC: 9.1 MG/DL (ref 8.4–10.2)
EOSINOPHIL # BLD AUTO: 0 K/MM3 (ref 0–0.4)
EOSINOPHIL NFR BLD AUTO: 0 % (ref 0–4.3)
HCT VFR BLD CALC: 29.6 % (ref 35.5–45.6)
HEMOLYSIS INDEX: 0
HGB BLD-MCNC: 9.5 GM/DL (ref 11.8–15.2)
LYMPHOCYTES # BLD AUTO: 0.6 K/MM3 (ref 1.2–5.4)
LYMPHOCYTES NFR BLD AUTO: 6.5 % (ref 13.4–35)
MCHC RBC AUTO-ENTMCNC: 32 % (ref 32–34)
MCV RBC AUTO: 79 FL (ref 84–94)
MONOCYTES # (AUTO): 0.6 K/MM3 (ref 0–0.8)
MONOCYTES % (AUTO): 6.2 % (ref 0–7.3)
PLATELET # BLD: 243 K/MM3 (ref 140–440)
RBC # BLD AUTO: 3.77 M/MM3 (ref 3.65–5.03)

## 2020-11-17 PROCEDURE — 5A1D70Z PERFORMANCE OF URINARY FILTRATION, INTERMITTENT, LESS THAN 6 HOURS PER DAY: ICD-10-PCS | Performed by: INTERNAL MEDICINE

## 2020-11-17 RX ADMIN — SEVELAMER CARBONATE SCH MG: 800 TABLET, FILM COATED ORAL at 12:38

## 2020-11-17 RX ADMIN — NIFEDIPINE SCH MG: 90 TABLET, EXTENDED RELEASE ORAL at 10:23

## 2020-11-17 RX ADMIN — CALCITRIOL SCH MCG: 0.25 CAPSULE ORAL at 10:23

## 2020-11-17 RX ADMIN — METOCLOPRAMIDE SCH MG: 10 TABLET ORAL at 08:56

## 2020-11-17 RX ADMIN — HEPARIN SODIUM SCH UNIT: 5000 INJECTION, SOLUTION INTRAVENOUS; SUBCUTANEOUS at 10:22

## 2020-11-17 RX ADMIN — PANTOPRAZOLE SODIUM SCH MG: 40 TABLET, DELAYED RELEASE ORAL at 10:23

## 2020-11-17 RX ADMIN — METOCLOPRAMIDE SCH MG: 10 TABLET ORAL at 17:40

## 2020-11-17 RX ADMIN — Medication SCH MG: at 11:57

## 2020-11-17 RX ADMIN — INSULIN LISPRO SCH UNIT: 100 INJECTION, SOLUTION INTRAVENOUS; SUBCUTANEOUS at 08:56

## 2020-11-17 RX ADMIN — SEVELAMER CARBONATE SCH MG: 800 TABLET, FILM COATED ORAL at 08:56

## 2020-11-17 RX ADMIN — Medication SCH ML: at 10:24

## 2020-11-17 RX ADMIN — INSULIN LISPRO SCH UNIT: 100 INJECTION, SOLUTION INTRAVENOUS; SUBCUTANEOUS at 12:50

## 2020-11-17 RX ADMIN — HEPARIN SODIUM SCH UNIT: 5000 INJECTION, SOLUTION INTRAVENOUS; SUBCUTANEOUS at 22:00

## 2020-11-17 RX ADMIN — OXYCODONE HYDROCHLORIDE AND ACETAMINOPHEN SCH MG: 500 TABLET ORAL at 10:22

## 2020-11-17 RX ADMIN — Medication SCH ML: at 22:02

## 2020-11-17 RX ADMIN — DEXAMETHASONE SCH MG: 4 TABLET ORAL at 10:23

## 2020-11-17 RX ADMIN — METOCLOPRAMIDE SCH MG: 10 TABLET ORAL at 22:00

## 2020-11-17 RX ADMIN — SEVELAMER CARBONATE SCH MG: 800 TABLET, FILM COATED ORAL at 17:40

## 2020-11-17 RX ADMIN — INSULIN LISPRO SCH UNIT: 100 INJECTION, SOLUTION INTRAVENOUS; SUBCUTANEOUS at 17:47

## 2020-11-17 NOTE — PROGRESS NOTE
Assessment and Plan





Cultures:


Coronavirus PCR: Positive


Blood culture 11/14/2020: No growth





A/P:


50-year-old male with ESRD on HD, diabetes, hypertension:





#Bilateral pneumonia: Secondary to COVID-19





#Acute hypoxic respiratory failure: Initially on room air, now on oxygen by 

nasal cannula.





#ESRD: On HD





#Diabetes mellitus, uncontrolled





Recs:


Continue IV/PO Dexamethasone 6 mg daily x 10 days


Not a candidate for remdesivir due to renal failure


prophylactic anticoagulation based on d-dimer


trend ferritin, LDH, d-dimer, CRP every 2-3 days for risk stratification and to 

assess disease progression








Kenneth Shoemaker MD, FACP


[facility] ([facility])


O: [phone]


F: [phone]





Subjective


Date of service: 11/17/20


Principal diagnosis: Bilateral lung PNA


Interval history: 





No fever. Remains on and off oxygen. 





Objective





- Exam


Narrative Exam: 





Physical Exam (reviewed in chart due to PPE conservation and minimize risk of 

transmission)


Constitutional: limited due to PPE conservation strategy


Head, Ears, Nose: limited due to PPE conservation strategy


Eyes: limited due to PPE conservation strategy


Neck: limited due to PPE conservation strategy


Oral: limited due to PPE conservation strategy


Cardiovascular: limited due to PPE conservation strategy


Respiratory: limited due to PPE conservation strategy


GI: limited due to PPE conservation strategy


Musculoskeletal: limited due to PPE conservation strategy


Skin: limited due to PPE conservation strategy


Hem/Lymphatic: limited due to PPE conservation strategy


Psych: limited due to PPE conservation strategy


Neurological: limited due to PPE conservation strategy 





- Constitutional


Vitals: 


                                   Vital Signs











Temp Pulse Resp BP Pulse Ox


 


 99.7 F H  78   18   124/74   98 


 


 11/17/20 12:07  11/17/20 14:45  11/17/20 12:07  11/17/20 14:45  11/17/20 12:07








                           Temperature -Last 24 Hours











Temperature                    99.7 F


 


Temperature                    97.9 F


 


Temperature                    98.3 F


 


Temperature                    98.1 F

















- Labs


CBC & Chem 7: 


                                 11/17/20 04:52





                                 11/17/20 04:52


Labs: 


                              Abnormal lab results











  11/16/20 11/17/20 11/17/20 Range/Units





  19:04 00:01 04:52 


 


Hgb    9.5 L  (11.8-15.2)  gm/dl


 


Hct    29.6 L  (35.5-45.6)  %


 


MCV    79 L  (84-94)  fl


 


MCH    25 L  (28-32)  pg


 


RDW    17.5 H  (13.2-15.2)  %


 


Lymph % (Auto)    6.5 L  (13.4-35.0)  %


 


Lymph # (Auto)    0.6 L  (1.2-5.4)  K/mm3


 


Seg Neutrophils %    87.0 H  (40.0-70.0)  %


 


Seg Neutrophils #    8.7 H  (1.8-7.7)  K/mm3


 


Potassium     (3.6-5.0)  mmol/L


 


Chloride     ()  mmol/L


 


BUN     (9-20)  mg/dL


 


Creatinine     (0.8-1.3)  mg/dL


 


Glucose     ()  mg/dL


 


POC Glucose  281 H  305 H   ()  mg/dL














  11/17/20 11/17/20 11/17/20 Range/Units





  04:52 08:06 11:42 


 


Hgb     (11.8-15.2)  gm/dl


 


Hct     (35.5-45.6)  %


 


MCV     (84-94)  fl


 


MCH     (28-32)  pg


 


RDW     (13.2-15.2)  %


 


Lymph % (Auto)     (13.4-35.0)  %


 


Lymph # (Auto)     (1.2-5.4)  K/mm3


 


Seg Neutrophils %     (40.0-70.0)  %


 


Seg Neutrophils #     (1.8-7.7)  K/mm3


 


Potassium  5.1 H    (3.6-5.0)  mmol/L


 


Chloride  96.6 L    ()  mmol/L


 


BUN  68 H    (9-20)  mg/dL


 


Creatinine  10.8 H    (0.8-1.3)  mg/dL


 


Glucose  238 H    ()  mg/dL


 


POC Glucose   223 H  164 H  ()  mg/dL

## 2020-11-17 NOTE — PROGRESS NOTE
Assessment and Plan





Impression:


* End stage renal disease on HD MWF


* COVID 19 PNA


* Hypertension


* Type II DM


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


* Secondary hyperparathyroidism





Plan:


* Continue MWF schedule - UF as tolerated


* plan for extra treatment today as well


* Dose medications for renal function


* COVID treatment per primary team


* Renal diet


* Epogen TIW prn


* AM labs ordered





Subjective


Date of service: 11/17/20


Principal diagnosis: Bilateral lung PNA


Interval history: 





chart reviewed, events noted





Objective





- Exam


Narrative Exam: 











In effort of PPE conservation strategy and to reduce transmission of COVID 19, 

physical exam deferred.





- Vital Signs


Vital signs: 


                               Vital Signs - 12hr











  11/16/20 11/16/20 11/17/20





  23:00 23:14 00:00


 


Temperature 98.3 F  


 


Pulse Rate 81  


 


Respiratory 18  20





Rate   


 


Blood Pressure   


 


Blood Pressure 175/94  





[Right]   


 


O2 Sat by Pulse 95 94 





Oximetry   














  11/17/20 11/17/20 11/17/20





  05:53 06:47 08:23


 


Temperature 97.9 F  


 


Pulse Rate 89 89 


 


Respiratory 18  





Rate   


 


Blood Pressure 189/104 189/104 


 


Blood Pressure   





[Right]   


 


O2 Sat by Pulse 93  96





Oximetry   














- Lab





                                 11/17/20 04:52





                                 11/17/20 04:52


                             Most recent lab results











Calcium  9.1 mg/dL (8.4-10.2)   11/17/20  04:52    














Medications & Allergies





- Medications


Allergies/Adverse Reactions: 


                                    Allergies





shellfish derived Allergy (Verified 10/22/19 07:36)


   Anaphylaxis








Home Medications: 


                                Home Medications











 Medication  Instructions  Recorded  Confirmed  Last Taken  Type


 


Insulin Aspart (Nf) [Novolog] 7 units SQ TID 10/22/19 11/13/20 10/21/19 17:00 

History


 


Metoclopramide [Reglan] 10 mg PO TID 30 Days #40 tab 10/22/19 11/13/20 Unknown 

Rx


 


Pantoprazole [Protonix] 40 mg PO QDAY 30 Days #30 tablet 10/22/19 11/13/20 

Unknown Rx


 


cloNIDine [Catapres] 0.1 mg PO DAILY 10/22/19 11/13/20 10/22/19 06:00 History











Active Medications: 














Generic Name Dose Route Start Last Admin





  Trade Name Freq  PRN Reason Stop Dose Admin


 


Acetaminophen  650 mg  11/13/20 12:00 





  Tylenol  PO  





  Q4H PRN  





  Pain MILD(1-3)/Fever >100.5/HA  


 


Albuterol  2.5 mg  11/13/20 12:00 





  Proventil  IH  





  Q4HRT PRN  





  Shortness Of Breath  


 


Ascorbic Acid  500 mg  11/15/20 11:00  11/16/20 13:03





  Vitamin C  PO   Not Given





  QDAY CHRIS  


 


Atorvastatin Calcium  80 mg  11/14/20 22:00  11/16/20 21:18





  Lipitor  PO   80 mg





  QHS CHRIS   Administration


 


Calcitriol  0.25 mcg  11/14/20 14:30  11/16/20 13:02





  Rocaltrol  PO   Not Given





  QDAY CHRIS  


 


Carvedilol  12.5 mg  11/14/20 14:00  11/16/20 21:18





  Coreg  PO   12.5 mg





  BID CHRIS   Administration


 


Clonidine HCl  0.1 mg  11/14/20 10:00  11/16/20 13:01





  Catapres  PO   Not Given





  DAILY CHRIS  


 


Dexamethasone  6 mg  11/16/20 16:00  11/16/20 18:50





  Decadron  PO  11/24/20 15:59  6 mg





  DAILY CHRIS   Administration


 


Heparin Sodium (Porcine)  5,000 unit  11/13/20 22:00  11/16/20 21:19





  Heparin  SUB-Q   5,000 unit





  Q12HR CHRIS   Administration


 


Hydralazine HCl  5 mg  11/14/20 10:16 





  Apresoline  IV  





  Q4H PRN  





  Hypertension  


 


Sodium Chloride  100 mls @ 999 mls/hr  11/13/20 11:40 





  Nacl 0.9%  IV  





  ANALY PRN  





  Hypotension  


 


Insulin Human Lispro  7 unit  11/13/20 18:30  11/17/20 08:56





  Humalog  SUB-Q   7 unit





  TIDAC [facility]   Administration


 


Lactulose  20 gm  11/14/20 10:16 





  Cephulac  PO  





  QDAY PRN  





  Constipation  


 


Metoclopramide HCl  5 mg  11/13/20 20:00  11/17/20 08:56





  Reglan  PO   5 mg





  TID CHRIS   Administration


 


Nifedipine  90 mg  11/17/20 10:00 





  Procardia Xl  PO  





  QDAY CHRIS  


 


Ondansetron HCl  4 mg  11/13/20 12:00 





  Zofran  IV  





  Q8H PRN  





  Nausea And Vomiting  


 


Pantoprazole Sodium  40 mg  11/14/20 10:00  11/16/20 13:02





  Protonix  PO   Not Given





  QDAY [facility]  


 


Sevelamer Carbonate  800 mg  11/14/20 16:30  11/17/20 08:56





  Renvela  PO   800 mg





  AC CHRIS   Administration


 


Sodium Chloride  10 ml  11/13/20 22:00  11/16/20 21:19





  Sodium Chloride Flush Syringe 10 Ml  IV   10 ml





  BID CHRIS   Administration


 


Sodium Chloride  10 ml  11/13/20 12:00 





  Sodium Chloride Flush Syringe 10 Ml  IV  





  PRN PRN  





  LINE FLUSH  


 


Trazodone HCl  50 mg  11/14/20 22:00  11/16/20 21:19





  Desyrel  PO   50 mg





  QHS CHRIS   Administration


 


Zinc Sulfate  220 mg  11/15/20 11:00  11/16/20 13:03





  Zinc Sulfate  PO   Not Given





  QDAY CHRIS

## 2020-11-17 NOTE — PROGRESS NOTE
Assessment and Plan





- Patient Problems


(1) Pneumonia due to COVID-19 virus


Current Visit: Yes   Status: Acute   


Plan to address problem: 





11/13 CXR shows bibasilar airspace opacities suspicious for pneumonia


11/14 COVID-19 PCR positive


Patient was initiated on Rocephin and azithromycin which was discontinued given

normal CRP


Contact and droplet isolation


OOB 3 times daily and as needed


Supplemental oxygenation as needed


Pulmonary hygiene


Infectious disease consulted


Dexamethasone p.o. 6 mg daily for 10 days (11/17 through 11/27)


Anticoagulation per COVID-19 protocol








(2) Acute respiratory failure with hypoxia


Current Visit: Yes   Status: Acute   


Plan to address problem: 


Secondary to COVID-19 pneumonia


Patient has been weaned to room air


Pulmonary hygiene








(3) Diabetes


Current Visit: Yes   Status: Chronic   


Plan to address problem: 


CC renal diet


SSI


Restarted home AC lispro 3 times daily, 11/17 increase the dose given 

persistent hypoglycemia


Accu-Cheks AC at bedtime








(4) ESRD needing dialysis


Current Visit: Yes   Status: Chronic   


Plan to address problem: 


HD per renal team


Nephrology consulted


Avoid nephrotoxic medications


Daily weights








(5) GERD (gastroesophageal reflux disease)


Current Visit: Yes   Status: Chronic   


Qualifiers: 


   Esophagitis presence: without esophagitis   Qualified Code(s): K21.9 - 

Gastro-esophageal reflux disease without esophagitis   


Plan to address problem: 


Continue home Protonix








(6) Hypertension


Current Visit: Yes   Status: Chronic   


Qualifiers: 


   Hypertension type: essential hypertension   Qualified Code(s): I10 - 

Essential (primary) hypertension   


Plan to address problem: 


Continue home antihypertensive regimen and adjust as needed


Blood pressure monitoring per protocol


As needed hydralazine for SBP greater than 160








(7) DVT prophylaxis


Current Visit: Yes   Status: Acute   


Plan to address problem: 


SCD to lower extremity while in bed


Subcu heparin








History


Interval history: 


51 YO Male with ESRD on HD(M,W,F), HTN, DM, GERD who presented to the emergency 

department on 11/13 complaining of feeling sick over the past 5 days with 

progressive symptoms of fever, malaise, weakness, decreased exercise 

intolerance, nausea, multiple episodes of vomiting, fatigue and decreased sense 

of taste.  Work-up in the emergency department included a chest x-ray which 

showed bilateral pneumonia.  Patient was initiated on the coronavirus protocol 

and admitted to the hospital service as a Covid PUI with pneumonia.  Nephrology,

ID consulted.  The time of my exam patient is getting HD and he is on room air. 

Patient states that he is not having any chest pain, shortness of breath or 

difficulty while sleeping.





11/14: COVID-19 PCR positive


11/15: Infectious disease consulted


11/16: No acute events








Hospitalist Physical





- Constitutional


Vitals: 


                                        











Temp Pulse Resp BP Pulse Ox


 


 99.7 F H  78   18   124/74   98 


 


 11/17/20 12:07  11/17/20 14:45  11/17/20 12:07  11/17/20 14:45  11/17/20 12:07











General appearance: Present: no acute distress, well-nourished





- EENT


Eyes: Present: PERRL, EOM intact


ENT: hearing intact, clear oral mucosa





- Neck


Neck: Present: supple, normal ROM





- Respiratory


Respiratory effort: normal


Respiratory: bilateral: diminished





- Cardiovascular


Rhythm: regular


Heart Sounds: Present: S1 & S2.  Absent: systolic murmur, diastolic murmur





- Extremities


Extremities: no ischemia, pulses intact, pulses symmetrical, No edema, normal 

temperature, normal color, Full ROM


Peripheral Pulses: within normal limits





- Abdominal


General gastrointestinal: soft, non-tender, non-distended, normal bowel sounds





- Integumentary


Integumentary: Present: clear, warm, dry





- Psychiatric


Psychiatric: appropriate mood/affect, cooperative





- Neurologic


Neurologic: CNII-XII intact, no moves all extremities (AKA)





Results





- Labs


CBC & Chem 7: 


                                 11/17/20 04:52





                                 11/17/20 04:52


Labs: 


                             Laboratory Last Values











WBC  9.9 K/mm3 (4.5-11.0)   11/17/20  04:52    


 


RBC  3.77 M/mm3 (3.65-5.03)   11/17/20  04:52    


 


Hgb  9.5 gm/dl (11.8-15.2)  L  11/17/20  04:52    


 


Hct  29.6 % (35.5-45.6)  L  11/17/20  04:52    


 


MCV  79 fl (84-94)  L  11/17/20  04:52    


 


MCH  25 pg (28-32)  L  11/17/20  04:52    


 


MCHC  32 % (32-34)   11/17/20  04:52    


 


RDW  17.5 % (13.2-15.2)  H  11/17/20  04:52    


 


Plt Count  243 K/mm3 (140-440)   11/17/20  04:52    


 


Lymph % (Auto)  6.5 % (13.4-35.0)  L  11/17/20  04:52    


 


Mono % (Auto)  6.2 % (0.0-7.3)   11/17/20  04:52    


 


Eos % (Auto)  0.0 % (0.0-4.3)   11/17/20  04:52    


 


Baso % (Auto)  0.3 % (0.0-1.8)   11/17/20  04:52    


 


Lymph # (Auto)  0.6 K/mm3 (1.2-5.4)  L  11/17/20  04:52    


 


Mono # (Auto)  0.6 K/mm3 (0.0-0.8)   11/17/20  04:52    


 


Eos # (Auto)  0.0 K/mm3 (0.0-0.4)   11/17/20  04:52    


 


Baso # (Auto)  0.0 K/mm3 (0.0-0.1)   11/17/20  04:52    


 


Seg Neutrophils %  87.0 % (40.0-70.0)  H  11/17/20  04:52    


 


Seg Neutrophils #  8.7 K/mm3 (1.8-7.7)  H  11/17/20  04:52    


 


D-Dimer  294.11 ng/mlDDU (0-234)  H  11/16/20  11:08    


 


Sodium  139 mmol/L (137-145)  D 11/17/20  04:52    


 


Potassium  5.1 mmol/L (3.6-5.0)  H  11/17/20  04:52    


 


Chloride  96.6 mmol/L ()  L  11/17/20  04:52    


 


Carbon Dioxide  28 mmol/L (22-30)   11/17/20  04:52    


 


Anion Gap  20 mmol/L  11/17/20  04:52    


 


BUN  68 mg/dL (9-20)  H  11/17/20  04:52    


 


Creatinine  10.8 mg/dL (0.8-1.3)  H  11/17/20  04:52    


 


Estimated GFR  6 ml/min  11/17/20  04:52    


 


BUN/Creatinine Ratio  6 %  11/17/20  04:52    


 


Glucose  238 mg/dL ()  H  11/17/20  04:52    


 


POC Glucose  164 mg/dL ()  H  11/17/20  11:42    


 


Calcium  9.1 mg/dL (8.4-10.2)   11/17/20  04:52    


 


Ferritin  779.4 ng/mL (30.0-300.0)  H  11/16/20  11:08    


 


Total Bilirubin  0.20 mg/dL (0.1-1.2)   11/13/20  09:03    


 


AST  19 units/L (5-40)   11/13/20  09:03    


 


ALT  6 units/L (7-56)  L  11/13/20  09:03    


 


Alkaline Phosphatase  61 units/L ()   11/13/20  09:03    


 


Lactate Dehydrogenase  228 units/L ()  H  11/16/20  11:08    


 


C-Reactive Protein  3.50 mg/dL (0.00-1.30)  H  11/16/20  11:08    


 


Total Protein  7.0 g/dL (6.3-8.2)   11/13/20  09:03    


 


Albumin  3.5 g/dL (3.9-5)  L  11/13/20  09:03    


 


Albumin/Globulin Ratio  1.0 %  11/13/20  09:03    


 


Procalcitonin  0.65 ng/mL (<0.15)   11/16/20  11:08    


 


Coronavirus (PCR)  Positive  (Negative)  A  11/14/20  10:21    


 


Hepatitis A IgM Ab  Non-reactive  (NonReactive)   11/13/20  11:57    


 


Hep Bs Antigen  Non-reactive  (Negative)   11/13/20  11:57    


 


Hep B Core IgM Ab  Non-reactive  (NonReactive)   11/13/20  11:57    


 


Hepatitis C Antibody  Non-reactive  (NonReactive)   11/13/20  11:57    











Microbiology: 


Microbiology





11/14/20 14:22   Peripheral/Venous   Blood Culture - Preliminary


                            NO GROWTH AFTER 48 HOURS


11/14/20 13:27   Peripheral/Venous   Blood Culture - Preliminary


                            NO GROWTH AFTER 48 HOURS








Rajput/IV: 


                                        





Voiding Method                   Urinal


IV Catheter Type [Left Forearm   INT / Saline Lock


]                                


IV Catheter Type [Left           INT / Saline Lock


Antecubital]                     











Active Medications





- Current Medications


Current Medications: 














Generic Name Dose Route Start Last Admin





  Trade Name Freq  PRN Reason Stop Dose Admin


 


Acetaminophen  650 mg  11/13/20 12:00 





  Tylenol  PO  





  Q4H PRN  





  Pain MILD(1-3)/Fever >100.5/HA  


 


Albuterol  2.5 mg  11/13/20 12:00 





  Proventil  IH  





  Q4HRT PRN  





  Shortness Of Breath  


 


Ascorbic Acid  500 mg  11/15/20 11:00  11/17/20 10:22





  Vitamin C  PO   500 mg





  QDAY CHRIS   Administration


 


Atorvastatin Calcium  80 mg  11/14/20 22:00  11/16/20 21:18





  Lipitor  PO   80 mg





  QHS CHRIS   Administration


 


Calcitriol  0.25 mcg  11/14/20 14:30  11/17/20 10:23





  Rocaltrol  PO   0.25 mcg





  QDAY CHRIS   Administration


 


Carvedilol  12.5 mg  11/14/20 14:00  11/17/20 10:23





  Coreg  PO   12.5 mg





  BID CHRIS   Administration


 


Clonidine HCl  0.1 mg  11/14/20 10:00  11/17/20 10:23





  Catapres  PO   0.1 mg





  DAILY CHRIS   Administration


 


Dexamethasone  6 mg  11/16/20 16:00  11/17/20 10:23





  Decadron  PO  11/24/20 15:59  6 mg





  DAILY CHRIS   Administration


 


Heparin Sodium (Porcine)  5,000 unit  11/13/20 22:00  11/17/20 10:22





  Heparin  SUB-Q   5,000 unit





  Q12HR CHRIS   Administration


 


Hydralazine HCl  5 mg  11/14/20 10:16 





  Apresoline  IV  





  Q4H PRN  





  Hypertension  


 


Sodium Chloride  100 mls @ 999 mls/hr  11/13/20 11:40 





  Nacl 0.9%  IV  





  ANALY PRN  





  Hypotension  


 


Sodium Chloride  100 mls @ 999 mls/hr  11/17/20 09:26 





  Nacl 0.9%  IV  





  ANALY PRN  





  Hypotension  


 


Lactulose  20 gm  11/14/20 10:16 





  Cephulac  PO  





  QDAY PRN  





  Constipation  


 


Metoclopramide HCl  5 mg  11/13/20 20:00  11/17/20 08:56





  Reglan  PO   5 mg





  TID CHRIS   Administration


 


Nifedipine  90 mg  11/17/20 10:00  11/17/20 10:23





  Procardia Xl  PO   90 mg





  QDAY [facility]   Administration


 


Ondansetron HCl  4 mg  11/13/20 12:00 





  Zofran  IV  





  Q8H PRN  





  Nausea And Vomiting  


 


Pantoprazole Sodium  40 mg  11/14/20 10:00  11/17/20 10:23





  Protonix  PO   40 mg





  QDAY [facility]   Administration


 


Sevelamer Carbonate  800 mg  11/14/20 16:30  11/17/20 12:38





  Renvela  PO   800 mg





  AC CHRIS   Administration


 


Sodium Chloride  10 ml  11/13/20 22:00  11/17/20 10:24





  Sodium Chloride Flush Syringe 10 Ml  IV   10 ml





  BID CHRIS   Administration


 


Sodium Chloride  10 ml  11/13/20 12:00 





  Sodium Chloride Flush Syringe 10 Ml  IV  





  PRN PRN  





  LINE FLUSH  


 


Trazodone HCl  50 mg  11/14/20 22:00  11/16/20 21:19





  Desyrel  PO   50 mg





  QHS CHRIS   Administration


 


Zinc Sulfate  220 mg  11/15/20 11:00  11/17/20 11:57





  Zinc Sulfate  PO   220 mg





  QDAY [facility]   Administration

## 2020-11-18 VITALS — SYSTOLIC BLOOD PRESSURE: 130 MMHG | DIASTOLIC BLOOD PRESSURE: 68 MMHG

## 2020-11-18 PROCEDURE — 5A1D70Z PERFORMANCE OF URINARY FILTRATION, INTERMITTENT, LESS THAN 6 HOURS PER DAY: ICD-10-PCS | Performed by: INTERNAL MEDICINE

## 2020-11-18 RX ADMIN — OXYCODONE HYDROCHLORIDE AND ACETAMINOPHEN SCH MG: 500 TABLET ORAL at 10:09

## 2020-11-18 RX ADMIN — INSULIN LISPRO SCH: 100 INJECTION, SOLUTION INTRAVENOUS; SUBCUTANEOUS at 12:31

## 2020-11-18 RX ADMIN — SEVELAMER CARBONATE SCH MG: 800 TABLET, FILM COATED ORAL at 08:54

## 2020-11-18 RX ADMIN — NIFEDIPINE SCH MG: 90 TABLET, EXTENDED RELEASE ORAL at 10:08

## 2020-11-18 RX ADMIN — CALCITRIOL SCH MCG: 0.25 CAPSULE ORAL at 10:08

## 2020-11-18 RX ADMIN — Medication SCH MG: at 10:15

## 2020-11-18 RX ADMIN — INSULIN LISPRO SCH UNIT: 100 INJECTION, SOLUTION INTRAVENOUS; SUBCUTANEOUS at 18:42

## 2020-11-18 RX ADMIN — INSULIN LISPRO SCH UNIT: 100 INJECTION, SOLUTION INTRAVENOUS; SUBCUTANEOUS at 08:53

## 2020-11-18 RX ADMIN — SEVELAMER CARBONATE SCH MG: 800 TABLET, FILM COATED ORAL at 12:16

## 2020-11-18 RX ADMIN — DEXAMETHASONE SCH MG: 4 TABLET ORAL at 10:08

## 2020-11-18 RX ADMIN — PANTOPRAZOLE SODIUM SCH MG: 40 TABLET, DELAYED RELEASE ORAL at 10:08

## 2020-11-18 RX ADMIN — METOCLOPRAMIDE SCH MG: 10 TABLET ORAL at 08:54

## 2020-11-18 RX ADMIN — Medication SCH ML: at 10:11

## 2020-11-18 RX ADMIN — HEPARIN SODIUM SCH UNIT: 5000 INJECTION, SOLUTION INTRAVENOUS; SUBCUTANEOUS at 10:09

## 2020-11-18 RX ADMIN — METOCLOPRAMIDE SCH MG: 10 TABLET ORAL at 14:58

## 2020-11-18 RX ADMIN — SEVELAMER CARBONATE SCH MG: 800 TABLET, FILM COATED ORAL at 18:43

## 2020-11-18 NOTE — DISCHARGE SUMMARY
Providers





- Providers


Date of Admission: 


11/13/20 11:21





Attending physician: 


GABRIELA WYATT MD





                                        





11/13/20 11:08


Consult to Physician [CONS] Routine 


   Comment: 


   Consulting Provider: JACQUE ROBERTS


   Physician Instructions: 


   Reason For Exam: ESRD needing dialysis





11/15/20 09:55


Consult to Physician [CONS] Routine 


   Comment: 


   Consulting Provider: DOMINGA HASSAN


   Physician Instructions: 


   Reason For Exam: covid positive











Primary care physician: 


PRIMARY CARE MD








Hospitalization


Condition: Stable


Hospital course: 


49 YO Male with ESRD on HD(M,W,F), HTN, DM, GERD who presented to the emergency 

department on 11/13 complaining of feeling sick over the past 5 days with 

progressive symptoms of fever, malaise, weakness, decreased exercise 

intolerance, nausea, multiple episodes of vomiting, fatigue and decreased sense 

of taste.  Work-up in the emergency department included a chest x-ray which 

showed bilateral pneumonia.  Patient was initiated on the coronavirus protocol 

and admitted to the hospital service as a Covid PUI with pneumonia.  Nephrology,

 ID consulted.  On 11/14 his COVID-19 PCR resulted as positive.  He was 

initiated on azithromycin and Rocephin at admit which was later discontinued.  

Patient was hypoxic and required supplemental oxygen but however has been weaned

 off and is on room air at this time.  Patient was on a candidate for remdesivir

 given renal function.  He was started on steroids while inpatient and will be 

discharged with dexamethasone to complete his 10-day course.  Patient will need 

to follow-up with his primary care physician within 1 to 2 weeks of discharge.  

Patient will need to resume his home hemodialysis schedule and follow-up with 

nephrology. 





Assessment and Plan





- Patient Problems


(1) Pneumonia due to COVID-19 virus


Current Visit: Yes   Status: Acute   


Plan to address problem: 


11/13 CXR shows bibasilar airspace opacities suspicious for pneumonia


11/14 COVID-19 PCR positive


Patient was initiated on Rocephin and azithromycin which was discontinued given

 normal CRP


Contact and droplet isolation


OOB 3 times daily and as needed


Pulmonary hygiene


Infectious disease consulted


Dexamethasone p.o. 6 mg daily for 10 days (11/17 through 11/27)





(2) Acute respiratory failure with hypoxia


Current Visit: Yes   Status: Acute   


Plan to address problem: 


Secondary to COVID-19 pneumonia


Patient has been weaned to room air


Pulmonary hygiene





(3) Diabetes


Current Visit: Yes   Status: Chronic   


Plan to address problem: 


Continue carb controlled renal diet


Continue home antidiabetic regimen


Follow-up with the primary care physician within 1 to 2 weeks of discharge


Blood sugar monitoring per PCP instructions





(4) ESRD needing dialysis


Current Visit: Yes   Status: Chronic   


Plan to address problem: 


HD per renal team


Nephrology consulted


Avoid nephrotoxic medications


Daily weights


Resume p.o. home HD schedule,





(5) GERD (gastroesophageal reflux disease)


Current Visit: Yes   Status: Chronic   


Qualifiers: 


   Esophagitis presence: without esophagitis   Qualified Code(s): K21.9 - 

Gastro-esophageal reflux disease without esophagitis   


Plan to address problem: 


Continue home Protonix





(6) Hypertension


Current Visit: Yes   Status: Chronic   


Qualifiers: 


   Hypertension type: essential hypertension   Qualified Code(s): I10 - Essenti

al (primary) hypertension   


Plan to address problem: 


Continue home antihypertensive regimen 


Blood pressure monitoring per primary care physician instructions





Disposition: DC-01 TO HOME OR SELFCARE


Time spent for discharge: 35





Core Measure Documentation





- Palliative Care


Palliative Care/ Comfort Measures: Not Applicable





- Core Measures


Any of the following diagnoses?: none





Exam





- Constitutional


Vitals: 


                                        











Temp Pulse Resp BP Pulse Ox


 


 97.9 F   80   20   122/62   98 


 


 11/18/20 04:10  11/18/20 04:10  11/18/20 04:10  11/18/20 04:10  11/18/20 04:10














Plan


Activity: advance as tolerated


Diet: low fat, low cholesterol, low salt, diabetic, renal


Special Instructions: record daily BP diary, record blood sugar diary


Additional Instructions: Contact your primary care physician or present to 

nearest emergency department if experience worsening symptoms.  Follow-up with 

your primary care physician and nephrologist within 1 to 2 weeks of discharge.  

Resume your home HD schedule.  Follow the COVID-19 guidelines set up for [employer] and

 will be contained within the booklet that your nurse will provide.


Follow up with: 


PRIMARY CARE,MD [Primary Care Provider] - 3-5 Days


ETHEL BROWN MD [Staff Physician] - 7 Days


Prescriptions: 


AtorvaSTATin [Lipitor] 80 mg PO QHS #60 tablet


cloNIDine [Catapres] 0.1 mg PO DAILY #30 tab


Insulin Lispro [Humalog] 8 unit SUB-Q TIDAC #1 vial


NIFEdipine XL [Procardia Xl] 90 mg PO QDAY #30 tablet


Pantoprazole [Protonix TAB] 40 mg PO QDAY 30 Days #30 tablet


Sevelamer Carbonate [Renvela] 800 mg PO AC #30 tablet

## 2020-11-18 NOTE — PROGRESS NOTE
Assessment and Plan





Cultures:


Coronavirus PCR: Positive


Blood culture 11/14/2020: No growth





A/P:


50-year-old male with ESRD on HD, diabetes, hypertension:





#Bilateral pneumonia: Secondary to COVID-19





#Acute hypoxic respiratory failure: Initially on room air, now on oxygen by 

nasal cannula.





#ESRD: On HD





#Diabetes mellitus, uncontrolled





Recs:


Continue IV/PO Dexamethasone 6 mg daily x 10 days


Remains on room air, agree with plans for discharge





ID will sign off. Please call with questions.








Kenneth Shoemaker MD, FACP


[facility] ([facility])


O: [phone]


F: [phone]





Subjective


Date of service: 11/18/20


Principal diagnosis: Bilateral lung PNA


Interval history: 


No fever. Remains off oxygen. 





Objective





- Exam


Narrative Exam: 


Physical Exam (reviewed in chart due to PPE conservation and minimize risk of 

transmission)


Constitutional: limited due to PPE conservation strategy


Head, Ears, Nose: limited due to PPE conservation strategy


Eyes: limited due to PPE conservation strategy


Neck: limited due to PPE conservation strategy


Oral: limited due to PPE conservation strategy


Cardiovascular: limited due to PPE conservation strategy


Respiratory: limited due to PPE conservation strategy


GI: limited due to PPE conservation strategy


Musculoskeletal: limited due to PPE conservation strategy


Skin: limited due to PPE conservation strategy


Hem/Lymphatic: limited due to PPE conservation strategy


Psych: limited due to PPE conservation strategy


Neurological: limited due to PPE conservation strategy 





- Constitutional


Vitals: 


                                   Vital Signs











Temp Pulse Resp BP Pulse Ox


 


 97.9 F   80   20   122/62   98 


 


 11/18/20 04:10  11/18/20 04:10  11/18/20 04:10  11/18/20 04:10  11/18/20 04:10








                           Temperature -Last 24 Hours











Temperature                    97.9 F


 


Temperature                    97.9 F


 


Temperature                    98.2 F

















- Labs


CBC & Chem 7: 


                                 11/17/20 04:52





                                 11/17/20 04:52


Labs: 


                              Abnormal lab results











  11/17/20 11/17/20 11/18/20 Range/Units





  16:58 21:06 09:02 


 


POC Glucose  159 H  172 H  162 H  ()  mg/dL














  11/18/20 Range/Units





  12:37 


 


POC Glucose  123 H  ()  mg/dL

## 2020-11-18 NOTE — PROGRESS NOTE
Assessment and Plan





Impression:


* End stage renal disease on HD MWF


* COVID 19 PNA


* Hypertension


* Type II DM


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


* Secondary hyperparathyroidism





Plan:


* Continue MWF schedule and prn - UF as tolerated


* Dose medications for renal function


* COVID treatment per primary team


* Renal diet


* Epogen TIW prn


* AM labs ordered





Subjective


Date of service: 11/18/20


Principal diagnosis: Bilateral lung PNA


Interval history: 





chart reviewed, events noted





Objective





- Exam


Narrative Exam: 











In effort of PPE conservation strategy and to reduce transmission of COVID 19, 

physical exam deferred.





- Vital Signs


Vital signs: 


                               Vital Signs - 12hr











  11/17/20 11/18/20





  23:02 04:10


 


Temperature  97.9 F


 


Pulse Rate  80


 


Respiratory 20 20





Rate  


 


Blood Pressure  122/62


 


O2 Sat by Pulse 100 98





Oximetry  














- Lab





                                 11/17/20 04:52





                                 11/17/20 04:52


                             Most recent lab results











Calcium  9.1 mg/dL (8.4-10.2)   11/17/20  04:52    














Medications & Allergies





- Medications


Allergies/Adverse Reactions: 


                                    Allergies





shellfish derived Allergy (Verified 10/22/19 07:36)


   Anaphylaxis








Home Medications: 


                                Home Medications











 Medication  Instructions  Recorded  Confirmed  Last Taken  Type


 


Insulin Aspart (Nf) [Novolog] 7 units SQ TID 10/22/19 11/13/20 10/21/19 17:00 

History


 


Metoclopramide [Reglan] 10 mg PO TID 30 Days #40 tab 10/22/19 11/13/20 Unknown 

Rx


 


Pantoprazole [Protonix] 40 mg PO QDAY 30 Days #30 tablet 10/22/19 11/13/20 

Unknown Rx


 


cloNIDine [Catapres] 0.1 mg PO DAILY 10/22/19 11/13/20 10/22/19 06:00 History











Active Medications: 














Generic Name Dose Route Start Last Admin





  Trade Name Freq  PRN Reason Stop Dose Admin


 


Acetaminophen  650 mg  11/13/20 12:00 





  Tylenol  PO  





  Q4H PRN  





  Pain MILD(1-3)/Fever >100.5/HA  


 


Albuterol  2.5 mg  11/13/20 12:00 





  Proventil  IH  





  Q4HRT PRN  





  Shortness Of Breath  


 


Ascorbic Acid  500 mg  11/15/20 11:00  11/17/20 10:22





  Vitamin C  PO   500 mg





  QDAY CHRIS   Administration


 


Atorvastatin Calcium  80 mg  11/14/20 22:00  11/17/20 22:00





  Lipitor  PO   80 mg





  QHS CHRIS   Administration


 


Calcitriol  0.25 mcg  11/14/20 14:30  11/17/20 10:23





  Rocaltrol  PO   0.25 mcg





  QDAY CHRIS   Administration


 


Carvedilol  12.5 mg  11/14/20 14:00  11/17/20 22:00





  Coreg  PO   12.5 mg





  BID CHRIS   Administration


 


Clonidine HCl  0.1 mg  11/14/20 10:00  11/17/20 10:23





  Catapres  PO   0.1 mg





  DAILY CHRIS   Administration


 


Dexamethasone  6 mg  11/16/20 16:00  11/17/20 10:23





  Decadron  PO  11/24/20 15:59  6 mg





  DAILY CHRIS   Administration


 


Heparin Sodium (Porcine)  5,000 unit  11/13/20 22:00  11/17/20 22:00





  Heparin  SUB-Q   5,000 unit





  Q12HR CHRIS   Administration


 


Hydralazine HCl  5 mg  11/14/20 10:16 





  Apresoline  IV  





  Q4H PRN  





  Hypertension  


 


Sodium Chloride  100 mls @ 999 mls/hr  11/17/20 09:26 





  Nacl 0.9%  IV  





  ANALY PRN  





  Hypotension  


 


Insulin Human Lispro  8 unit  11/17/20 16:30  11/18/20 08:53





  Humalog  SUB-Q   8 unit





  TIDAC CHRIS   Administration


 


Lactulose  20 gm  11/14/20 10:16 





  Cephulac  PO  





  QDAY PRN  





  Constipation  


 


Metoclopramide HCl  5 mg  11/13/20 20:00  11/18/20 08:54





  Reglan  PO   5 mg





  TID CHRIS   Administration


 


Nifedipine  90 mg  11/17/20 10:00  11/17/20 10:23





  Procardia Xl  PO   90 mg





  QDAY CHRIS   Administration


 


Ondansetron HCl  4 mg  11/13/20 12:00 





  Zofran  IV  





  Q8H PRN  





  Nausea And Vomiting  


 


Pantoprazole Sodium  40 mg  11/14/20 10:00  11/17/20 10:23





  Protonix  PO   40 mg





  QDAY CHRIS   Administration


 


Sevelamer Carbonate  800 mg  11/14/20 16:30  11/18/20 08:54





  Renvela  PO   800 mg





  AC CHRIS   Administration


 


Sodium Chloride  10 ml  11/13/20 22:00  11/17/20 22:02





  Sodium Chloride Flush Syringe 10 Ml  IV   10 ml





  BID CHRIS   Administration


 


Sodium Chloride  10 ml  11/13/20 12:00 





  Sodium Chloride Flush Syringe 10 Ml  IV  





  PRN PRN  





  LINE FLUSH  


 


Trazodone HCl  50 mg  11/14/20 22:00  11/17/20 22:00





  Desyrel  PO   50 mg





  QHS CHRIS   Administration


 


Zinc Sulfate  220 mg  11/15/20 11:00  11/17/20 11:57





  Zinc Sulfate  PO   220 mg





  QDAY CHRIS   Administration

## 2020-12-29 ENCOUNTER — HOSPITAL ENCOUNTER (OUTPATIENT)
Dept: HOSPITAL 5 - ED | Age: 50
Setting detail: OBSERVATION
LOS: 1 days | Discharge: HOME | End: 2020-12-30
Attending: INTERNAL MEDICINE | Admitting: INTERNAL MEDICINE
Payer: OTHER GOVERNMENT

## 2020-12-29 DIAGNOSIS — I12.0: Primary | ICD-10-CM

## 2020-12-29 DIAGNOSIS — Z79.4: ICD-10-CM

## 2020-12-29 DIAGNOSIS — E87.5: ICD-10-CM

## 2020-12-29 DIAGNOSIS — E21.3: ICD-10-CM

## 2020-12-29 DIAGNOSIS — E11.22: ICD-10-CM

## 2020-12-29 DIAGNOSIS — D63.1: ICD-10-CM

## 2020-12-29 DIAGNOSIS — Z99.2: ICD-10-CM

## 2020-12-29 DIAGNOSIS — K21.9: ICD-10-CM

## 2020-12-29 DIAGNOSIS — N18.6: ICD-10-CM

## 2020-12-29 DIAGNOSIS — Z79.899: ICD-10-CM

## 2020-12-29 DIAGNOSIS — Z98.890: ICD-10-CM

## 2020-12-29 LAB
ALBUMIN SERPL-MCNC: 3.4 G/DL (ref 3.9–5)
ALT SERPL-CCNC: 5 UNITS/L (ref 7–56)
BASOPHILS # (AUTO): 0.1 K/MM3 (ref 0–0.1)
BASOPHILS NFR BLD AUTO: 2.2 % (ref 0–1.8)
BUN SERPL-MCNC: 43 MG/DL (ref 9–20)
BUN/CREAT SERPL: 5 %
CALCIUM SERPL-MCNC: 8.3 MG/DL (ref 8.4–10.2)
EOSINOPHIL # BLD AUTO: 0.3 K/MM3 (ref 0–0.4)
EOSINOPHIL NFR BLD AUTO: 4.6 % (ref 0–4.3)
HCT VFR BLD CALC: 25 % (ref 35.5–45.6)
HEMOLYSIS INDEX: 1
HGB BLD-MCNC: 8 GM/DL (ref 11.8–15.2)
LYMPHOCYTES # BLD AUTO: 2 K/MM3 (ref 1.2–5.4)
LYMPHOCYTES NFR BLD AUTO: 37.2 % (ref 13.4–35)
MCHC RBC AUTO-ENTMCNC: 32 % (ref 32–34)
MCV RBC AUTO: 81 FL (ref 84–94)
MONOCYTES # (AUTO): 0.5 K/MM3 (ref 0–0.8)
MONOCYTES % (AUTO): 8.5 % (ref 0–7.3)
PLATELET # BLD: 323 K/MM3 (ref 140–440)
RBC # BLD AUTO: 3.08 M/MM3 (ref 3.65–5.03)

## 2020-12-29 PROCEDURE — 80053 COMPREHEN METABOLIC PANEL: CPT

## 2020-12-29 PROCEDURE — 93005 ELECTROCARDIOGRAM TRACING: CPT

## 2020-12-29 PROCEDURE — 84132 ASSAY OF SERUM POTASSIUM: CPT

## 2020-12-29 PROCEDURE — 36415 COLL VENOUS BLD VENIPUNCTURE: CPT

## 2020-12-29 PROCEDURE — 96365 THER/PROPH/DIAG IV INF INIT: CPT

## 2020-12-29 PROCEDURE — 96375 TX/PRO/DX INJ NEW DRUG ADDON: CPT

## 2020-12-29 PROCEDURE — 99291 CRITICAL CARE FIRST HOUR: CPT

## 2020-12-29 PROCEDURE — 85025 COMPLETE CBC W/AUTO DIFF WBC: CPT

## 2020-12-29 PROCEDURE — G0378 HOSPITAL OBSERVATION PER HR: HCPCS

## 2020-12-29 PROCEDURE — 80074 ACUTE HEPATITIS PANEL: CPT

## 2020-12-29 PROCEDURE — 82962 GLUCOSE BLOOD TEST: CPT

## 2020-12-29 PROCEDURE — 96372 THER/PROPH/DIAG INJ SC/IM: CPT

## 2020-12-29 NOTE — EVENT NOTE
ED Screening Note


Date of service: 12/29/20


Time: 19:03


ED Screening Note: 


1-year-old -American male dialysis Monday Wednesday Friday presents to 

the ER by referral from dialysis center stating that his potassium was too high.





This initial assessment/diagnostic orders/clinical plan/treatment(s) is/are 

subject to change based on patients health status, clinical progression and re-

assessment by fellow clinical providers in the ED. Further treatment and workup 

at subsequent clinical providers discretion. Patient/guardian urged not to elope

from the ED as their condition may be serious if not clinically assessed and 

managed. 





Initial orders include:

## 2020-12-30 VITALS — SYSTOLIC BLOOD PRESSURE: 183 MMHG | DIASTOLIC BLOOD PRESSURE: 97 MMHG

## 2020-12-30 RX ADMIN — SEVELAMER CARBONATE SCH MG: 800 TABLET, FILM COATED ORAL at 12:00

## 2020-12-30 RX ADMIN — INSULIN LISPRO SCH: 100 INJECTION, SOLUTION INTRAVENOUS; SUBCUTANEOUS at 17:15

## 2020-12-30 RX ADMIN — INSULIN LISPRO SCH: 100 INJECTION, SOLUTION INTRAVENOUS; SUBCUTANEOUS at 09:16

## 2020-12-30 RX ADMIN — INSULIN LISPRO SCH: 100 INJECTION, SOLUTION INTRAVENOUS; SUBCUTANEOUS at 12:00

## 2020-12-30 RX ADMIN — SEVELAMER CARBONATE SCH MG: 800 TABLET, FILM COATED ORAL at 09:16

## 2020-12-30 NOTE — HISTORY AND PHYSICAL REPORT
History of Present Illness


Date of examination: 12/30/20


Date of admission: 


12/30/2020


Chief complaint: 





abnormal lab


History of present illness: 





50-year-old -American male with history of hypertension, diabetes, GERD, 

COVID (11/2020) who presents to [facility] ED after presenting to dialysis clinic on 

Monday (12/28) and was noted to have a potassium of 7.2.  Patient states that he

was advised to report to ED on Monday for treatment of hyperkalemia.  Patient 

states that he feels fine, denies chest pain or palpitations. His nephrologist 

is Dr. JAVIER Cerrato , and is a patient of  [facility].





Denies fever, headache, sore throat, shortness of breath, chest pain, 

palpitation, nausea, vomiting, diarrhea, generalized weakness/ fatigue or 

blurred vision





Past History


Past Medical History: diabetes, dialysis (M/W/F), ESRD, GERD, hypertension, 

other (COVID (11/2020))


Past Surgical History: Other (Left Arm AV Fistula, Lft BKA)


Social history: , Lives alone, full code.  denies: alcohol abuse, 

prescription drug abuse, IV drug use


Family history: diabetes, hypertension





Medications and Allergies


                                    Allergies











Allergy/AdvReac Type Severity Reaction Status Date / Time


 


shellfish derived Allergy  Anaphylaxis Verified 10/22/19 07:36











                                Home Medications











 Medication  Instructions  Recorded  Confirmed  Last Taken  Type


 


Metoclopramide [Reglan TAB] 10 mg PO TID 30 Days #40 tab 10/22/19 11/13/20 

Unknown Rx


 


ALBUTEROL NEB's [Proventil 0.083% 2.5 mg IH Q4HRT PRN  nebu 11/18/20  Unknown Rx





NEBS]     


 


Acetaminophen [Acetaminophen TAB] 650 mg PO Q4H PRN  tablet 11/18/20  Unknown Rx


 


Ascorbic Acid [Vitamin C] 500 mg PO QDAY  tablet 11/18/20  Unknown Rx


 


AtorvaSTATin [Lipitor] 80 mg PO QHS #60 tablet 11/18/20  Unknown Rx


 


Dexamethasone 6 mg PO DAILY #6 tablet 11/18/20  Unknown Rx


 


Insulin Lispro [Humalog] 8 unit SUB-Q TIDAC #1 vial 11/18/20  Unknown Rx


 


NIFEdipine XL [Procardia Xl] 90 mg PO QDAY #30 tablet 11/18/20  Unknown Rx


 


Pantoprazole [Protonix TAB] 40 mg PO QDAY 30 Days #30 tablet 11/18/20  Unknown 

Rx


 


Sevelamer Carbonate [Renvela] 800 mg PO AC #30 tablet 11/18/20  Unknown Rx


 


Zinc Sulfate 220 mg PO QDAY  capsule 11/18/20  Unknown Rx


 


calcitrioL [Rocaltrol] 0.25 mcg PO QDAY  capsule 11/18/20  Unknown Rx


 


carvediloL [Coreg] 12.5 mg PO BID #60 tablet 11/18/20  Unknown Rx


 


cloNIDine [Catapres] 0.1 mg PO DAILY #30 tab 11/18/20  Unknown Rx











Active Meds: 


Active Medications





Acetaminophen (Acetaminophen 325 Mg Tab)  650 mg PO Q4H PRN


   PRN Reason: Pain MILD(1-3)/Fever >100.5/HA


Atorvastatin Calcium (Atorvastatin 40 Mg Tab)  80 mg PO QHS [facility]


Calcitriol (Calcitriol 0.25 Mcg Cap)  0.25 mcg PO QDAY CHRIS


Carvedilol (Carvedilol 12.5 Mg Tab)  12.5 mg PO BID@0800,1700 [facility]


Clonidine HCl (Clonidine 0.1 Mg Tab)  0.1 mg PO DAILY [facility]


Dextrose (Dextrose 50% In Water (25gm) 50 Ml Syringe)  0 ml IV Q30MIN PRN; 

Protocol


   PRN Reason: Hypoglycemia


Heparin Sodium (Porcine) (Heparin 5,000 Unit/1 Ml Vial)  5,000 unit SUB-Q Q12HR 

[facility]


Calcium Gluconate 1,000 mg/ (Sodium Chloride)  110 mls @ 660 mls/hr IV ONCE ONE


   Stop: 12/30/20 02:39


Insulin Human Lispro (Insulin Lispro 100 Unit/Ml Vial 3 Ml)  0 unit SUB-Q ACHS 

CHRIS; Protocol


Insulin Human Regular (Insulin Regular, Human 100 Unit/Ml 3ml Vial)  8 unit IV 

ONCE ONE


   Stop: 12/30/20 03:16


Nifedipine (Nifedipine Xl 90 Mg Tab)  90 mg PO QDAY@0600 [facility]


Ondansetron HCl (Ondansetron 4 Mg/2 Ml Inj)  4 mg IV Q6H PRN


   PRN Reason: Nausea And Vomiting


Pantoprazole Sodium (Pantoprazole 40 Mg Tab)  40 mg PO QDAY [facility]


Sevelamer Carbonate (Sevelamer Carbonate 800 Mg Tab)  800 mg PO AC [facility]


Sodium Bicarbonate (Sodium Bicarb 8.4% 50 Meq/50 Ml Syringe)  50 meq IV ONCE ONE


   Stop: 12/30/20 03:16


Sodium Chloride (Sodium Chloride 0.9% 10 Ml Flush Syringe)  10 ml IV BID CHRIS


Sodium Chloride (Sodium Chloride 0.9% 10 Ml Flush Syringe)  10 ml IV PRN PRN


   PRN Reason: LINE FLUSH


Sodium Polystyrene Sulfonate (Sodium Polystyrene 15 Gm/60 Ml Oral Liqd)  60 gm 

PO ONCE ONE


   Stop: 12/30/20 03:16











Review of Systems


All systems: negative (Except as noted in HPI)





Exam





- Physical Exam


Narrative exam: 





Physical exam





General appearance: Present: No acute distress, alert and oriented 3, pleasant 

-American male 





- EENT


Eyes: Present: PERRL, EOM intact


ENT: hearing intact, normal dentition





- Neck


Neck: Present: supple, normal ROM





- Respiratory


Respiratory effort: Non-labored


Respiratory: Clear throughout





- Cardiovascular


Heart rate: 108 (bpm)


Rhythm: Sinus tachycardia


Heart Sounds: Present: S1 & S2.  Absent: rub, click





- Extremities


Extremities: no ischemia, pulses intact, left BKA; left upper arm AV fistula





- Peripheral Assessment


Peripheral Pulses: within normal limits


 


- Abdominal


General gastrointestinal: soft, non-tender, normal bowel sounds





- Integumentary


Integumentary: Present: warm, dry





- Musculoskeletal


Musculoskeletal: Able to move all extremities





-Neurological


Neurological: CN II-XII intact





- Psychiatric


Psychiatric: Appropriate for situation ,cooperative





- Constitutional


Vitals: 


                                        











Temp Pulse Resp BP Pulse Ox


 


 98.4 F   96 H  14   147/84   96 


 


 12/29/20 17:51  12/29/20 17:51  12/29/20 17:51  12/29/20 17:51  12/29/20 17:51














HEART Score





- HEART Score


Troponin: 


                                        











WBC  5.4 K/mm3 (4.5-11.0)   12/29/20  19:06    


 


RBC  3.08 M/mm3 (3.65-5.03)  L  12/29/20  19:06    


 


Hgb  8.0 gm/dl (11.8-15.2)  L  12/29/20  19:06    


 


Hct  25.0 % (35.5-45.6)  L  12/29/20  19:06    


 


MCV  81 fl (84-94)  L  12/29/20  19:06    


 


MCH  26 pg (28-32)  L  12/29/20  19:06    


 


MCHC  32 % (32-34)   12/29/20  19:06    


 


RDW  18.4 % (13.2-15.2)  H  12/29/20  19:06    


 


Plt Count  323 K/mm3 (140-440)   12/29/20  19:06    


 


Lymph % (Auto)  37.2 % (13.4-35.0)  H  12/29/20  19:06    


 


Mono % (Auto)  8.5 % (0.0-7.3)  H  12/29/20  19:06    


 


Eos % (Auto)  4.6 % (0.0-4.3)  H  12/29/20  19:06    


 


Baso % (Auto)  2.2 % (0.0-1.8)  H  12/29/20  19:06    


 


Lymph # (Auto)  2.0 K/mm3 (1.2-5.4)   12/29/20  19:06    


 


Mono # (Auto)  0.5 K/mm3 (0.0-0.8)   12/29/20  19:06    


 


Eos # (Auto)  0.3 K/mm3 (0.0-0.4)   12/29/20  19:06    


 


Baso # (Auto)  0.1 K/mm3 (0.0-0.1)   12/29/20  19:06    


 


Seg Neutrophils %  47.5 % (40.0-70.0)   12/29/20  19:06    


 


Seg Neutrophils #  2.6 K/mm3 (1.8-7.7)   12/29/20  19:06    


 


Sodium  139 mmol/L (137-145)   12/29/20  19:06    


 


Potassium  6.0 mmol/L (3.6-5.0)  H  12/29/20  19:06    


 


Chloride  98.4 mmol/L ()   12/29/20  19:06    


 


Carbon Dioxide  28 mmol/L (22-30)   12/29/20  19:06    


 


Anion Gap  19 mmol/L  12/29/20  19:06    


 


BUN  43 mg/dL (9-20)  H  12/29/20  19:06    


 


Creatinine  8.8 mg/dL (0.8-1.3)  H  12/29/20  19:06    


 


Estimated GFR  8 ml/min  12/29/20  19:06    


 


BUN/Creatinine Ratio  5 %  12/29/20  19:06    


 


Glucose  115 mg/dL ()  H  12/29/20  19:06    


 


Calcium  8.3 mg/dL (8.4-10.2)  L  12/29/20  19:06    


 


Total Bilirubin  0.20 mg/dL (0.1-1.2)   12/29/20  19:06    


 


AST  8 units/L (5-40)   12/29/20  19:06    


 


ALT  5 units/L (7-56)  L  12/29/20  19:06    


 


Alkaline Phosphatase  77 units/L ()   12/29/20  19:06    


 


Total Protein  6.4 g/dL (6.3-8.2)   12/29/20  19:06    


 


Albumin  3.4 g/dL (3.9-5)  L  12/29/20  19:06    


 


Albumin/Globulin Ratio  1.1 %  12/29/20  19:06    














Results





- Labs


CBC & Chem 7: 


                                 12/29/20 19:06





                                 12/29/20 19:06


Labs: 


                             Laboratory Last Values











WBC  5.4 K/mm3 (4.5-11.0)   12/29/20  19:06    


 


RBC  3.08 M/mm3 (3.65-5.03)  L  12/29/20  19:06    


 


Hgb  8.0 gm/dl (11.8-15.2)  L  12/29/20  19:06    


 


Hct  25.0 % (35.5-45.6)  L  12/29/20  19:06    


 


MCV  81 fl (84-94)  L  12/29/20  19:06    


 


MCH  26 pg (28-32)  L  12/29/20  19:06    


 


MCHC  32 % (32-34)   12/29/20  19:06    


 


RDW  18.4 % (13.2-15.2)  H  12/29/20  19:06    


 


Plt Count  323 K/mm3 (140-440)   12/29/20  19:06    


 


Lymph % (Auto)  37.2 % (13.4-35.0)  H  12/29/20  19:06    


 


Mono % (Auto)  8.5 % (0.0-7.3)  H  12/29/20  19:06    


 


Eos % (Auto)  4.6 % (0.0-4.3)  H  12/29/20  19:06    


 


Baso % (Auto)  2.2 % (0.0-1.8)  H  12/29/20  19:06    


 


Lymph # (Auto)  2.0 K/mm3 (1.2-5.4)   12/29/20  19:06    


 


Mono # (Auto)  0.5 K/mm3 (0.0-0.8)   12/29/20  19:06    


 


Eos # (Auto)  0.3 K/mm3 (0.0-0.4)   12/29/20  19:06    


 


Baso # (Auto)  0.1 K/mm3 (0.0-0.1)   12/29/20  19:06    


 


Seg Neutrophils %  47.5 % (40.0-70.0)   12/29/20  19:06    


 


Seg Neutrophils #  2.6 K/mm3 (1.8-7.7)   12/29/20  19:06    


 


Sodium  139 mmol/L (137-145)   12/29/20  19:06    


 


Potassium  6.0 mmol/L (3.6-5.0)  H  12/29/20  19:06    


 


Chloride  98.4 mmol/L ()   12/29/20  19:06    


 


Carbon Dioxide  28 mmol/L (22-30)   12/29/20  19:06    


 


Anion Gap  19 mmol/L  12/29/20  19:06    


 


BUN  43 mg/dL (9-20)  H  12/29/20  19:06    


 


Creatinine  8.8 mg/dL (0.8-1.3)  H  12/29/20  19:06    


 


Estimated GFR  8 ml/min  12/29/20  19:06    


 


BUN/Creatinine Ratio  5 %  12/29/20  19:06    


 


Glucose  115 mg/dL ()  H  12/29/20  19:06    


 


Calcium  8.3 mg/dL (8.4-10.2)  L  12/29/20  19:06    


 


Total Bilirubin  0.20 mg/dL (0.1-1.2)   12/29/20  19:06    


 


AST  8 units/L (5-40)   12/29/20  19:06    


 


ALT  5 units/L (7-56)  L  12/29/20  19:06    


 


Alkaline Phosphatase  77 units/L ()   12/29/20  19:06    


 


Total Protein  6.4 g/dL (6.3-8.2)   12/29/20  19:06    


 


Albumin  3.4 g/dL (3.9-5)  L  12/29/20  19:06    


 


Albumin/Globulin Ratio  1.1 %  12/29/20  19:06    














Assessment and Plan


Assessment and plan: 








50-year-old -American male with history of hypertension, diabetes, GERD, 

COVID (11/2020) who presents to [facility] ED after presenting to dialysis clinic on 

Monday (12/28) and was noted to have a potassium of 7.2.  











Hyperkalemia


-on admission 6.0


-Was 7.2 at out patient dialysis clinic


-Received hyperkalemic cocktail in ED


-On continuous telemetry monitoring


-Continue to monitor electrolytes





ESRD on HD


-M/W/F


-Requiring urgent dialysis


-Last dialyzed Monday 12/28


-Avoid nephrotoxin agents


-Renal dose all meds


-Nephrology consulted () with plans for HD in a.m.





HTN


-Monitor BP


-Resume home hypertensive meds 





DM


-POC BG monitoring


-SSI coverage prn





History of GERD


-On Protonix





DVT PPX


-on Heparin














Advance Directives: No


VTE prophylaxis?: Chemical


Plan of care discussed with patient/family: Yes

## 2020-12-30 NOTE — DISCHARGE SUMMARY
Providers





- Providers


Date of Admission: 


12/30/20 01:54





Date of discharge: 12/30/20


Attending physician: 


LEYLA FITZPATRICK





                                        





12/30/20 01:46


Consult to Physician [CONS] Urgent 


   Comment: Dr. Glover spoke with Dr. Dawkins @ 0150


   Consulting Provider: JACQUE DAWKINS


   Physician Instructions: 


   Reason For Exam: esrd hyperkalemia











Primary care physician: 


PRIMARY CARE MD








Hospitalization


Condition: Good


Hospital course: 





50-year-old male presented with hyperkalemia from primary care's office.  

Patient missed hemodialysis and was scheduled today.  Evaluated by nephrology 

and patient stable to be discharged after hemodialysis today.  Patient is alert 

oriented x3 no chest pain no tachycardia Hospital course has been unremarkable. 

 Scheduled for hemodialysis by Dr. Luna will discharge home after 

hemodialysis.


Disposition: DC-01 TO HOME OR SELFCARE





- Discharge Diagnoses


(1) Hyperkalemia


Status: Acute   Comment: Patient received Kayexalate calcium gluconate.  Will 

have hemodialysis today stable to discharge home after dialysis.  Repeat K 

normal at 5.4.   





(2) ESRD needing dialysis


Status: Chronic   Comment: Because of her hemodialysis Dr. Luna.   





Core Measure Documentation





- Palliative Care


Palliative Care/ Comfort Measures: Not Applicable





- Core Measures


Any of the following diagnoses?: none





Exam





- Constitutional


Vitals: 


                                        











Temp Pulse Resp BP Pulse Ox


 


 97.8 F   106 H  18   188/101   93 


 


 12/30/20 14:00  12/30/20 16:45  12/30/20 14:00  12/30/20 16:45  12/30/20 08:40











General appearance: Present: no acute distress, well-nourished





- EENT


Eyes: Present: PERRL


ENT: hearing intact, clear oral mucosa





- Neck


Neck: Present: supple, normal ROM





- Respiratory


Respiratory effort: normal


Respiratory: bilateral: CTA





- Cardiovascular


Heart Sounds: Present: S1 & S2.  Absent: rub, click





- Extremities


Extremities: pulses symmetrical, No edema


Peripheral Pulses: within normal limits





- Abdominal


General gastrointestinal: Present: soft, non-tender, non-distended, normal bowel

 sounds


Male genitourinary: Present: normal





- Integumentary


Integumentary: Present: clear, warm, dry





- Musculoskeletal


Musculoskeletal: gait normal, strength equal bilaterally





- Psychiatric


Psychiatric: appropriate mood/affect, intact judgment & insight





- Neurologic


Neurologic: CNII-XII intact, moves all extremities





Plan


Activity: no restrictions


Diet: renal


Follow up with: 


PRIMARY CARE,MD [Primary Care Provider] - 3-5 Days

## 2020-12-30 NOTE — CONSULTATION
History of Present Illness





- Reason for Consult


Consult date: 12/30/20


end stage renal disease, hyperkalemia


Requesting physician: LEYLA FITZPATRICK





- History of Present Illness








50-year-old -American male with history of hypertension, diabetes, GERD, 

COVID (11/2020) who presents to [facility] ED after presenting to dialysis clinic on 

Monday (12/28) and was noted to have a potassium of 7.2.  Patient states that he

was advised to report to ED on Monday for treatment of hyperkalemia.  Patient 

states that he feels fine, denies chest pain or palpitations. His nephrologist 

is Dr. JAVIER Cerrato , and is a patient of  [facility].





Denies fever, headache, sore throat, shortness of breath, chest pain, 

palpitation, nausea, vomiting, diarrhea, generalized weakness/ fatigue or 

blurred vision





Past History


Past Medical History: diabetes, dialysis (M/W/F), ESRD, GERD, hypertension, 

other (COVID (11/2020))


Past Surgical History: Other (Left Arm AV Fistula, Lft BKA)


Social history: , Lives alone, full code.  denies: alcohol abuse, 

prescription drug abuse, IV drug use


Family history: diabetes, hypertension











Review of Systems


All systems: negative (Except as noted in HPI)











Past History


Past Medical History: diabetes, dialysis (M/W/F), ESRD, GERD, hypertension, 

other (COVID (11/2020))


Past Surgical History: Other (Left Arm AV Fistula, Lft BKA)


Social history: , Lives alone, full code.  denies: alcohol abuse, 

prescription drug abuse, IV drug use


Family history: diabetes, hypertension





Medications and Allergies


                                    Allergies











Allergy/AdvReac Type Severity Reaction Status Date / Time


 


shellfish derived Allergy  Anaphylaxis Verified 10/22/19 07:36











                                Home Medications











 Medication  Instructions  Recorded  Confirmed  Last Taken  Type


 


Metoclopramide [Reglan TAB] 10 mg PO TID 30 Days #40 tab 10/22/19 12/30/20 

12/29/20 16:00 Rx


 


ALBUTEROL NEB's [Proventil 0.083% 2.5 mg IH Q4HRT PRN  nebu 11/18/20 12/30/20 

Unknown Rx





NEBS]     


 


Acetaminophen [Acetaminophen TAB] 650 mg PO Q4H PRN  tablet 11/18/20 12/30/20 

Unknown Rx


 


Ascorbic Acid [Vitamin C] 500 mg PO QDAY  tablet 11/18/20 12/30/20 12/29/20 

16:00 Rx


 


AtorvaSTATin [Lipitor] 80 mg PO QHS #60 tablet 11/18/20 12/30/20 12/29/20 16:00 

Rx


 


Dexamethasone 6 mg PO DAILY #6 tablet 11/18/20 12/30/20 12/29/20 16:00 Rx


 


Insulin Lispro [Humalog] 8 unit SUB-Q TIDAC #1 vial 11/18/20 12/30/20 12/29/20 

16:00 Rx


 


NIFEdipine XL [Procardia Xl] 90 mg PO QDAY #30 tablet 11/18/20 12/30/20 12/29/20

16:00 Rx


 


Pantoprazole [Protonix TAB] 40 mg PO QDAY 30 Days #30 tablet 11/18/20 12/30/20 12/29/20 16:00 Rx


 


Sevelamer Carbonate [Renvela] 800 mg PO AC #30 tablet 11/18/20 12/30/20 12/29/20

 16:00 Rx


 


Zinc Sulfate 220 mg PO QDAY  capsule 11/18/20 12/30/20 12/29/20 16:00 Rx


 


calcitrioL [Rocaltrol] 0.25 mcg PO QDAY  capsule 11/18/20 12/30/20 12/29/20 

16:00 Rx


 


carvediloL [Coreg] 12.5 mg PO BID #60 tablet 11/18/20 12/30/20 12/29/20 16:00 Rx


 


cloNIDine [Catapres] 0.1 mg PO DAILY #30 tab 11/18/20 12/30/20 12/29/20 16:00 Rx











Active Meds: 


Active Medications





Acetaminophen (Acetaminophen 325 Mg Tab)  650 mg PO Q4H PRN


   PRN Reason: Pain MILD(1-3)/Fever >100.5/HA


Atorvastatin Calcium (Atorvastatin 40 Mg Tab)  80 mg PO QHS [facility]


Calcitriol (Calcitriol 0.25 Mcg Cap)  0.25 mcg PO QDAY [facility]


   Last Admin: 12/30/20 09:16 Dose:  0.25 mcg


   Documented by: 


Carvedilol (Carvedilol 12.5 Mg Tab)  12.5 mg PO BID@0800,1700 [facility]


   Last Admin: 12/30/20 09:15 Dose:  12.5 mg


   Documented by: 


Clonidine HCl (Clonidine 0.1 Mg Tab)  0.1 mg PO DAILY [facility]


   Last Admin: 12/30/20 09:16 Dose:  0.1 mg


   Documented by: 


Dextrose (Dextrose 50% In Water (25gm) 50 Ml Syringe)  0 ml IV Q30MIN PRN; 

Protocol


   PRN Reason: Hypoglycemia


Epoetin Jason (Epoetin Jason 10,000 Unit/1 Ml Inj)  10,000 unit IV ANALY PRN


   PRN Reason: hemodialysis


Heparin Sodium (Porcine) (Heparin 5,000 Unit/1 Ml Vial)  5,000 unit SUB-Q Q12HR 

[facility]


   Last Admin: 12/30/20 09:16 Dose:  5,000 unit


   Documented by: 


Sodium Chloride (Nacl 0.9%)  100 mls @ 999 mls/hr IV ANALY PRN


   PRN Reason: Hypotension


Insulin Human Lispro (Insulin Lispro 100 Unit/Ml Vial 3 Ml)  0 unit SUB-Q ACHS 

[facility]; Protocol


   Last Admin: 12/30/20 09:16 Dose:  Not Given


   Documented by: 


Nifedipine (Nifedipine Xl 90 Mg Tab)  90 mg PO QDAY@0600 [facility]


   Last Admin: 12/30/20 07:00 Dose:  90 mg


   Documented by: 


Ondansetron HCl (Ondansetron 4 Mg/2 Ml Inj)  4 mg IV Q6H PRN


   PRN Reason: Nausea And Vomiting


Pantoprazole Sodium (Pantoprazole 40 Mg Tab)  40 mg PO QDAY [facility]


   Last Admin: 12/30/20 09:16 Dose:  40 mg


   Documented by: 


Sevelamer Carbonate (Sevelamer Carbonate 800 Mg Tab)  800 mg PO AC [facility]


   Last Admin: 12/30/20 09:16 Dose:  800 mg


   Documented by: 


Sodium Chloride (Sodium Chloride 0.9% 10 Ml Flush Syringe)  10 ml IV BID [facility]


   Last Admin: 12/30/20 09:17 Dose:  10 ml


   Documented by: 


Sodium Chloride (Sodium Chloride 0.9% 10 Ml Flush Syringe)  10 ml IV PRN PRN


   PRN Reason: LINE FLUSH











Exam





- Vital Signs


Vital signs: 


                                   Vital Signs











Temp Pulse Resp BP Pulse Ox


 


 98.4 F   96 H  14   147/84   96 


 


 12/29/20 17:51  12/29/20 17:51  12/29/20 17:51  12/29/20 17:51  12/29/20 17:51














- Physical Exam


Narrative exam: 








General appearance: Present: No acute distress, alert and oriented 3, pleasant 

-American male 





- EENT


Eyes: Present: PERRL, EOM intact


ENT: hearing intact, normal dentition





- Neck


Neck: Present: supple, normal ROM





- Respiratory


Respiratory effort: Non-labored


Respiratory: Clear throughout





- Cardiovascular


Heart rate: 108 (bpm)


Rhythm: Sinus tachycardia


Heart Sounds: Present: S1 & S2.  Absent: rub, click





- Extremities


Extremities: no ischemia, pulses intact, left BKA; left upper arm AV fistula





- Peripheral Assessment


Peripheral Pulses: within normal limits


 


- Abdominal


General gastrointestinal: soft, non-tender, normal bowel sounds





- Integumentary


Integumentary: Present: warm, dry





- Musculoskeletal


Musculoskeletal: Able to move all extremities





-Neurological


Neurological: CN II-XII intact





- Psychiatric


Psychiatric: Appropriate for situation ,cooperative








Results





- Lab Results





                                 12/29/20 19:06





                                 12/29/20 19:06


                             Most recent lab results











Calcium  8.3 mg/dL (8.4-10.2)  L  12/29/20  19:06    














Assessment and Plan





Impression:


* End stage renal disease on HD MWF


* recent COVID 19 PNA


* Hypertension


* Type II DM


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


* Secondary hyperparathyroidism





Plan:


* Continue MWF schedule and prn - UF as tolerated


* Dose medications for renal function


* Renal diet


* Epogen TIW prn


* AM labs ordered


* ok to dc home after hd today

## 2020-12-30 NOTE — EMERGENCY DEPARTMENT REPORT
ED General Adult HPI





- General


Chief complaint: Medical Clearance


Stated complaint: ELEVATED POTASSIUM LEVELS


PUI?: No


Time Seen by Provider: 20 01:35


Source: patient, EMS ( EMS documentation not available at time of chart 

dictation ), RN notes reviewed, old records reviewed


Mode of arrival: Stretcher


Limitations: Physical Limitation





- History of Present Illness


Initial comments: 





The patient was evaluated in the emergency department for symptoms described in 

the history of present illness.  He/she was evaluated in the context of the 

global COVID-19 pandemic, which necessitated consideration that the patient 

might be at risk for infection with the virus that causes COVID-19.  

Institutional protocols and algorithms that pertain to the evaluation of 

patients at risk for COVID-19 are in a state of rapid change based on 

information released by regulatory bodies including the CDC and federal and 

state organizations.  These policies and algorithms were followed during the 

patient's care in the emergency department.  Please note that these policies, 

procedures and recommendations changed on a rapid basis.








Patient is a pleasant 50-year-old gentleman.  He has a history of end-stage 

renal disease and is currently on hemodialysis.  He also has a history of 

hypertension, left upper extremity fistula, and left lower extremity below-knee 

amputation.





He typically gets dialysis Monday, Wednesday, Friday.





His last dialysis session was this past Monday, was of normal length in 

duration.  Patient feels like he is at his expected volume/fluid status at this 

time.  The patient states he has no acute medical complaints at this time.  

Apparently, he was instructed to come to the emergency room, because outpatient 

laboratory studies demonstrated hyperkalemia, with a potassium of 7.2.  However,

the patient states that he feels "fine", and indicates that if he were not 

instructed to come to the emergency room by his nephrologist, he would not have 

presented otherwise.





His nephrologist is Dr. JAVIER Cerrato , And he receives hemodialysis at [facility]


Improves with: none


Worsens with: none


Associated Symptoms: denies other symptoms





- Related Data


                                  Previous Rx's











 Medication  Instructions  Recorded  Last Taken  Type


 


Metoclopramide [Reglan TAB] 10 mg PO TID 30 Days #40 tab 10/22/19 Unknown Rx


 


ALBUTEROL NEB's [Proventil 0.083% 2.5 mg IH Q4HRT PRN  nebu 20 Unknown Rx





NEBS]    


 


Acetaminophen [Acetaminophen TAB] 650 mg PO Q4H PRN  tablet 20 Unknown Rx


 


Ascorbic Acid [Vitamin C] 500 mg PO QDAY  tablet 20 Unknown Rx


 


AtorvaSTATin [Lipitor] 80 mg PO QHS #60 tablet 20 Unknown Rx


 


Dexamethasone 6 mg PO DAILY #6 tablet 20 Unknown Rx


 


Insulin Lispro [Humalog] 8 unit SUB-Q TIDAC #1 vial 20 Unknown Rx


 


NIFEdipine XL [Procardia Xl] 90 mg PO QDAY #30 tablet 20 Unknown Rx


 


Pantoprazole [Protonix TAB] 40 mg PO QDAY 30 Days #30 tablet 20 Unknown Rx


 


Sevelamer Carbonate [Renvela] 800 mg PO AC #30 tablet 20 Unknown Rx


 


Zinc Sulfate 220 mg PO QDAY  capsule 20 Unknown Rx


 


calcitrioL [Rocaltrol] 0.25 mcg PO QDAY  capsule 20 Unknown Rx


 


carvediloL [Coreg] 12.5 mg PO BID #60 tablet 20 Unknown Rx


 


cloNIDine [Catapres] 0.1 mg PO DAILY #30 tab 20 Unknown Rx











                                    Allergies











Allergy/AdvReac Type Severity Reaction Status Date / Time


 


shellfish derived Allergy  Anaphylaxis Verified 10/22/19 07:36














ED Review of Systems


ROS: 


Stated complaint: ELEVATED POTASSIUM LEVELS


Other details as noted in HPI





Comment: All other systems reviewed and negative





ED Past Medical Hx





- Past Medical History


Hx Hypertension: Yes


Hx Congestive Heart Failure: No


Hx Diabetes: Yes


Hx GERD: Yes


Hx Renal Disease: Yes


Hx Asthma: No


Hx COPD: No





- Surgical History


Additional Surgical History: graft





- Social History


Smoking Status: Never Smoker





- Medications


Home Medications: 


                                Home Medications











 Medication  Instructions  Recorded  Confirmed  Last Taken  Type


 


Metoclopramide [Reglan TAB] 10 mg PO TID 30 Days #40 tab 10/22/19 11/13/20 

Unknown Rx


 


ALBUTEROL NEB's [Proventil 0.083% 2.5 mg IH Q4HRT PRN  nebu 20  Unknown Rx





NEBS]     


 


Acetaminophen [Acetaminophen TAB] 650 mg PO Q4H PRN  tablet 20  Unknown Rx


 


Ascorbic Acid [Vitamin C] 500 mg PO QDAY  tablet 20  Unknown Rx


 


AtorvaSTATin [Lipitor] 80 mg PO QHS #60 tablet 20  Unknown Rx


 


Dexamethasone 6 mg PO DAILY #6 tablet 20  Unknown Rx


 


Insulin Lispro [Humalog] 8 unit SUB-Q TIDAC #1 vial 20  Unknown Rx


 


NIFEdipine XL [Procardia Xl] 90 mg PO QDAY #30 tablet 20  Unknown Rx


 


Pantoprazole [Protonix TAB] 40 mg PO QDAY 30 Days #30 tablet 20  Unknown 

Rx


 


Sevelamer Carbonate [Renvela] 800 mg PO AC #30 tablet 20  Unknown Rx


 


Zinc Sulfate 220 mg PO QDAY  capsule 20  Unknown Rx


 


calcitrioL [Rocaltrol] 0.25 mcg PO QDAY  capsule 20  Unknown Rx


 


carvediloL [Coreg] 12.5 mg PO BID #60 tablet 20  Unknown Rx


 


cloNIDine [Catapres] 0.1 mg PO DAILY #30 tab 20  Unknown Rx














ED Physical Exam





- General


Limitations: Physical Limitation


General appearance: alert, in no apparent distress





- Head


Head exam: Present: atraumatic, normocephalic





- Eye


Eye exam: Present: normal appearance, EOMI.  Absent: nystagmus





- ENT


ENT exam: Present: normal exam, normal orophraynx, mucous membranes moist, 

normal external ear exam





- Neck


Neck exam: Present: normal inspection, full ROM.  Absent: tenderness, 

meningismus





- Respiratory


Respiratory exam: Present: normal lung sounds bilaterally.  Absent: respiratory 

distress, wheezes, rales, rhonchi, stridor, decreased breath sounds





- Cardiovascular


Cardiovascular Exam: Present: regular rate, normal rhythm, normal heart sounds. 

 Absent: bradycardia, tachycardia, irregular rhythm, systolic murmur, diastolic 

murmur, rubs, gallop





- GI/Abdominal


GI/Abdominal exam: Present: soft.  Absent: distended, tenderness, guarding, 

rebound, rigid, pulsatile mass





- Rectal


Rectal exam: Present: deferred





- Extremities Exam


Extremities exam: Present: normal inspection, full ROM, pedal edema (2+ edema in

 the right lower extremity), other (2+ pulses noted in the bilateral upper 

extremities.  There is a left upper extremity fistula, without redness, pus, 

streaking and an appropriate thrill.  Left lower extremity status post below-

knee amputation).  Absent: calf tenderness





- Back Exam


Back exam: Present: normal inspection.  Absent: tenderness, CVA tenderness (R), 

CVA tenderness (L), paraspinal tenderness, vertebral tenderness





- Neurological Exam


Neurological exam: Present: alert, oriented X3, other (No facial droop.  Tongue 

midline.  Extraocular movements intact bilaterally.  Facial sensation intact to 

light touch in V1, V2, V3 distribution bilaterally.  5 and a 5 strength in 4 

extremities.  Sensation intact to light touch in 4 extremities.).  Absent: motor

 sensory deficit





- Psychiatric


Psychiatric exam: Present: normal affect, normal mood





- Skin


Skin exam: Present: warm, dry, intact, normal color.  Absent: rash





ED Course


                                   Vital Signs











  20





  17:51


 


Temperature 98.4 F


 


Pulse Rate 96 H


 


Respiratory 14





Rate 


 


Blood Pressure 147/84


 


O2 Sat by Pulse 96





Oximetry 














- Reevaluation(s)


Reevaluation #1: 





20 01:50


Differential diagnosis, including but not limited to: Hyperkalemia, azotemia, 

uremia, metabolic acidosis, laboratory error





Assessment and plan: Gentleman, who was afebrile, with reassuring vital signs, 

with a complaint of painless hyperkalemia.





Laboratory studies are reviewed and appreciated, and demonstrate azotemia, 

uremia, and hyperkalemia.








Hyperkalemia cocktail is ordered.  Contacted nephrology on-call, Dr. Dawkins, discussed history, physical, and pertinent laboratory studies.  

Admission is recommended for supportive care, and urgent hemodialysis.





Have discussed this plan of care with the patient, who verbalized understanding,

 and who is amenable to this plan of care.





Hospital medicine team, NATHALY Veloz, working with Dr JUSTIN Vidal, will admit





Medical decision makin-year-old gentleman with hyperkalemia, requires 

monitoring, medical therapy for hyperkalemia, and urgent hemodialysis.








20 01:52








ED Medical Decision Making





- Lab Data


Result diagrams: 


                                 20 19:06





                                 20 19:06








                                   Vital Signs











  20





  17:51


 


Temperature 98.4 F


 


Pulse Rate 96 H


 


Respiratory 14





Rate 


 


Blood Pressure 147/84


 


O2 Sat by Pulse 96





Oximetry 











                                   Lab Results











  20 Range/Units





  19:06 19:06 


 


WBC  5.4   (4.5-11.0)  K/mm3


 


RBC  3.08 L   (3.65-5.03)  M/mm3


 


Hgb  8.0 L   (11.8-15.2)  gm/dl


 


Hct  25.0 L   (35.5-45.6)  %


 


MCV  81 L   (84-94)  fl


 


MCH  26 L   (28-32)  pg


 


MCHC  32   (32-34)  %


 


RDW  18.4 H   (13.2-15.2)  %


 


Plt Count  323   (140-440)  K/mm3


 


Lymph % (Auto)  37.2 H   (13.4-35.0)  %


 


Mono % (Auto)  8.5 H   (0.0-7.3)  %


 


Eos % (Auto)  4.6 H   (0.0-4.3)  %


 


Baso % (Auto)  2.2 H   (0.0-1.8)  %


 


Lymph # (Auto)  2.0   (1.2-5.4)  K/mm3


 


Mono # (Auto)  0.5   (0.0-0.8)  K/mm3


 


Eos # (Auto)  0.3   (0.0-0.4)  K/mm3


 


Baso # (Auto)  0.1   (0.0-0.1)  K/mm3


 


Seg Neutrophils %  47.5   (40.0-70.0)  %


 


Seg Neutrophils #  2.6   (1.8-7.7)  K/mm3


 


Sodium   139  (137-145)  mmol/L


 


Potassium   6.0 H  (3.6-5.0)  mmol/L


 


Chloride   98.4  ()  mmol/L


 


Carbon Dioxide   28  (22-30)  mmol/L


 


Anion Gap   19  mmol/L


 


BUN   43 H  (9-20)  mg/dL


 


Creatinine   8.8 H  (0.8-1.3)  mg/dL


 


Estimated GFR   8  ml/min


 


BUN/Creatinine Ratio   5  %


 


Glucose   115 H  ()  mg/dL


 


Calcium   8.3 L  (8.4-10.2)  mg/dL


 


Total Bilirubin   0.20  (0.1-1.2)  mg/dL


 


AST   8  (5-40)  units/L


 


ALT   5 L  (7-56)  units/L


 


Alkaline Phosphatase   77  ()  units/L


 


Total Protein   6.4  (6.3-8.2)  g/dL


 


Albumin   3.4 L  (3.9-5)  g/dL


 


Albumin/Globulin Ratio   1.1  %














- EKG Data


-: EKG Interpreted by Me


EKG shows normal: sinus rhythm


Rate: tachycardia





- EKG Data





20 01:58


Sinus rhythm, tachycardia, 101 bpm, left axis deviation, left anterior 

fascicular block, left ventricular hypertrophy, poor R wave progression, no 

endorsement of chest pain, Q waves in lead III.  This EKG is abnormal.  This EKG

 is not a STEMI.


Critical Care Time: Yes


Critical care time in (mins) excluding proc time.: 35


Critical care attestation.: 


If time is entered above; I have spent that time in minutes in the direct care 

of this critically ill patient, excluding procedure time.








ED Disposition


Clinical Impression: 


 ESRD needing dialysis, Hyperkalemia





Disposition: [id number] OP ADMIT IP TO THIS HOSP


Is pt being admited?: Yes


Does the pt Need Aspirin: No


Condition: Good


Referrals: 


PRIMARY CARE,MD [Primary Care Provider] - 3-5 Days

## 2021-04-14 ENCOUNTER — HOSPITAL ENCOUNTER (OUTPATIENT)
Dept: HOSPITAL 5 - ED | Age: 51
Setting detail: OBSERVATION
Discharge: HOME | End: 2021-04-14
Attending: INTERNAL MEDICINE | Admitting: INTERNAL MEDICINE
Payer: MEDICAID

## 2021-04-14 DIAGNOSIS — Z98.890: ICD-10-CM

## 2021-04-14 DIAGNOSIS — E11.65: ICD-10-CM

## 2021-04-14 DIAGNOSIS — E87.5: ICD-10-CM

## 2021-04-14 DIAGNOSIS — F17.210: ICD-10-CM

## 2021-04-14 DIAGNOSIS — E11.22: ICD-10-CM

## 2021-04-14 DIAGNOSIS — I12.0: ICD-10-CM

## 2021-04-14 DIAGNOSIS — Z79.899: ICD-10-CM

## 2021-04-14 DIAGNOSIS — N28.9: ICD-10-CM

## 2021-04-14 DIAGNOSIS — E78.5: ICD-10-CM

## 2021-04-14 DIAGNOSIS — I16.0: Primary | ICD-10-CM

## 2021-04-14 DIAGNOSIS — Z79.4: ICD-10-CM

## 2021-04-14 DIAGNOSIS — E83.39: ICD-10-CM

## 2021-04-14 DIAGNOSIS — Z99.2: ICD-10-CM

## 2021-04-14 DIAGNOSIS — N18.6: ICD-10-CM

## 2021-04-14 LAB
BUN SERPL-MCNC: 68 MG/DL (ref 9–20)
BUN/CREAT SERPL: 6 %
CALCIUM SERPL-MCNC: 8.8 MG/DL (ref 8.4–10.2)
HCT VFR BLD CALC: 35.8 % (ref 35.5–45.6)
HDLC SERPL-MCNC: 42 MG/DL (ref 40–59)
HEMOLYSIS INDEX: 9
HGB BLD-MCNC: 11.7 GM/DL (ref 11.8–15.2)
MCHC RBC AUTO-ENTMCNC: 33 % (ref 32–34)
MCV RBC AUTO: 77 FL (ref 84–94)
PLATELET # BLD: 319 K/MM3 (ref 140–440)
RBC # BLD AUTO: 4.63 M/MM3 (ref 3.65–5.03)

## 2021-04-14 PROCEDURE — 80074 ACUTE HEPATITIS PANEL: CPT

## 2021-04-14 PROCEDURE — 71045 X-RAY EXAM CHEST 1 VIEW: CPT

## 2021-04-14 PROCEDURE — 93005 ELECTROCARDIOGRAM TRACING: CPT

## 2021-04-14 PROCEDURE — G0378 HOSPITAL OBSERVATION PER HR: HCPCS

## 2021-04-14 PROCEDURE — 84484 ASSAY OF TROPONIN QUANT: CPT

## 2021-04-14 PROCEDURE — 84100 ASSAY OF PHOSPHORUS: CPT

## 2021-04-14 PROCEDURE — 96374 THER/PROPH/DIAG INJ IV PUSH: CPT

## 2021-04-14 PROCEDURE — 80061 LIPID PANEL: CPT

## 2021-04-14 PROCEDURE — 36415 COLL VENOUS BLD VENIPUNCTURE: CPT

## 2021-04-14 PROCEDURE — 85027 COMPLETE CBC AUTOMATED: CPT

## 2021-04-14 PROCEDURE — 80048 BASIC METABOLIC PNL TOTAL CA: CPT

## 2021-04-14 PROCEDURE — 99291 CRITICAL CARE FIRST HOUR: CPT

## 2021-04-14 PROCEDURE — 84132 ASSAY OF SERUM POTASSIUM: CPT

## 2021-04-14 NOTE — CONSULTATION
History of Present Illness





- Reason for Consult


Consult date: 04/14/21


end stage renal disease, hyperkalemia





- History of Present Illness


The patient is a 50 YO male with history significant for HTN, HLD, ?DM type 2, 

GERD, Anemia, ESRD on hemodialysis (MWF) and recent COVID infection (11/2020) 

who was sent to [facility] ED 4/14 from the outpatient dialysis clinic for evaluation 

of uncontrolled HTN.  Pt went to his dialysis clinic but his BP was very high 

and was sent to the ED for further evaluation.  He missed 2 of his BP meds last 

night.  Patient denies any complaint.  He was last dialyzed 2 days ago.  He 

denies chest pain or shortness of breath.  Initial BP in the ED was 227/125 and 

received IV Labetalol with decrease in the BP.  Labs significant for K 5.5 and 

glu 244.  Nephrology was consulted for further evaluation.





Past History


Past Medical History: other (See HPI.)





Medications and Allergies


                                    Allergies











Allergy/AdvReac Type Severity Reaction Status Date / Time


 


shellfish derived Allergy  Anaphylaxis Verified 10/22/19 07:36











                                Home Medications











 Medication  Instructions  Recorded  Confirmed  Last Taken  Type


 


Metoclopramide [Reglan TAB] 10 mg PO TID 30 Days #40 tab 10/22/19 12/30/20 

12/29/20 16:00 Rx


 


ALBUTEROL NEB's [Proventil 0.083% 2.5 mg IH Q4HRT PRN  nebu 11/18/20 12/30/20 

Unknown Rx





NEBS]     


 


Acetaminophen [Acetaminophen TAB] 650 mg PO Q4H PRN  tablet 11/18/20 12/30/20 

Unknown Rx


 


Ascorbic Acid [Vitamin C] 500 mg PO QDAY  tablet 11/18/20 12/30/20 12/29/20 

16:00 Rx


 


AtorvaSTATin [Lipitor] 80 mg PO QHS #60 tablet 11/18/20 12/30/20 12/29/20 16:00 

Rx


 


Dexamethasone 6 mg PO DAILY #6 tablet 11/18/20 12/30/20 12/29/20 16:00 Rx


 


Insulin Lispro [Humalog] 8 unit SUB-Q TIDAC #1 vial 11/18/20 12/30/20 12/29/20 

16:00 Rx


 


NIFEdipine XL [Procardia Xl] 90 mg PO QDAY #30 tablet 11/18/20 12/30/20 12/29/20

16:00 Rx


 


Pantoprazole [Protonix TAB] 40 mg PO QDAY 30 Days #30 tablet 11/18/20 12/30/20 12/29/20 16:00 Rx


 


Sevelamer Carbonate [Renvela] 800 mg PO AC #30 tablet 11/18/20 12/30/20 12/29/20

 16:00 Rx


 


Zinc Sulfate 220 mg PO QDAY  capsule 11/18/20 12/30/20 12/29/20 16:00 Rx


 


calcitrioL [Rocaltrol] 0.25 mcg PO QDAY  capsule 11/18/20 12/30/20 12/29/20 

16:00 Rx


 


carvediloL [Coreg] 12.5 mg PO BID #60 tablet 11/18/20 12/30/20 12/29/20 16:00 Rx


 


cloNIDine [Catapres] 0.1 mg PO DAILY #30 tab 11/18/20 12/30/20 12/29/20 16:00 Rx


 


Acetaminophen [Acetaminophen TAB] 650 mg PO Q4H PRN  tablet 12/30/20  Unknown Rx


 


Dextrose 50% in Water [D50W (25GM) 0 ml IV Q30MIN PRN  syringe 12/30/20  Unknown

 Rx





Syringe]     


 


Epoetin Jason 10,000 Unit [Procrit] 10,000 unit IV ANALY PRN  vial 12/30/20  

Unknown Rx


 


NIFEdipine XL [Procardia Xl] 90 mg PO QDAY@0600  tablet 12/30/20  Unknown Rx











Active Meds: 


Active Medications





Acetaminophen (Acetaminophen 325 Mg Tab)  650 mg PO Q4H PRN


   PRN Reason: Pain MILD(1-3)/Fever >100.5/HA


Albuterol (Albuterol 2.5 Mg/3 Ml Nebu)  2.5 mg IH Q4HRT PRN


   PRN Reason: Shortness Of Breath


Ascorbic Acid (Ascorbic Acid 500 Mg Tab)  500 mg PO QDAY CHRIS


Atorvastatin Calcium (Atorvastatin 40 Mg Tab)  80 mg PO QHS CHRIS


Calcitriol (Calcitriol 0.25 Mcg Cap)  0.25 mcg PO QDAY CHRIS


Carvedilol (Carvedilol 12.5 Mg Tab)  12.5 mg PO BID CHRIS


Clonidine HCl (Clonidine 0.1 Mg Tab)  0.1 mg PO DAILY CHRIS


Insulin Human Lispro (Insulin Lispro 100 Unit/Ml)  8 unit SUB-Q TIDAC CHRIS


Metoclopramide HCl (Metoclopramide 10 Mg Tab)  10 mg PO TID [facility]


Miscellaneous Medication (Epoetin Jason 10,000 Unit [Procrit])  10,000 unit IV 

ANALY PRN


   PRN Reason: hemodialysis


Nifedipine (Nifedipine Xl 90 Mg Tab)  90 mg PO QDAY CHRIS


Pantoprazole Sodium (Pantoprazole 40 Mg Tab)  40 mg PO QDAY CHRIS


Sevelamer Carbonate (Sevelamer Carbonate 800 Mg Tab)  800 mg PO AC [facility]











Review of Systems


Constitutional: no weight loss, no weight gain, no fever, no chills, no 

anorexia, no weakness


Ears, nose, mouth and throat: no epistaxis


Cardiovascular: high blood pressure, no chest pain, no orthopnea, no edema, no 

syncope, no lightheadedness, no shortness of breath, no dyspnea on exertion, no 

leg edema


Respiratory: no cough, no cough with sputum, no hemoptysis, no shortness of 

breath, no dyspnea on exertion


Gastrointestinal: no abdominal pain, no nausea, no vomiting, no diarrhea, no 

melena


Genitourinary Male: no dysuria, no hematuria


Rectal: no bleeding


Integumentary: no rash, no wounds


Neurological: no seizures, no syncope, no convulsions, no aphasia, no change in 

speech, no confusion, no memory loss





Exam





- Vital Signs


Vital signs: 


                                   Vital Signs











Temp Pulse Resp BP Pulse Ox


 


 98.0 F   99 H  15   227/125   97 


 


 04/14/21 08:23  04/14/21 08:23  04/14/21 08:23  04/14/21 08:23  04/14/21 08:23














Results





- Lab Results





                                 04/14/21 08:50





                                 04/14/21 08:50


                             Most recent lab results











Calcium  8.8 mg/dL (8.4-10.2)   04/14/21  08:50    


 


Phosphorus  8.30 mg/dL (2.5-4.5)  H  04/14/21  08:50    














Assessment and Plan


1. ESRD:


Patient is on maintenance hemodialysis three times a week, MWF schedule.


Meds dosage based on GFR.


Hemodialysis: 4/14.





2. FEN:


Hyperkalemia, HD today.


Volume overload, UF with HD as tolerated, monitor.


Monitor lytes and volume status.





3. Uncontrolled HTN:


2/2 medication non-compliance.


Resume home meds.


Monitor.


Counseled.





4. Anemia:


Monitor.


Epogen if needed.





5. DM type 2:


Monitor bl. sugar.











Subjective:


Patient was seen and examined at the bedside.











Objective:


General appearance: well-developed, appears stated age, not in distress


HEENT: ATNC, ISHA


Neck: trachea midline


Respiratory: ctab


Heart: regular, S1S2, no murmur


Gastrointestinal: soft, normoactive bowel sounds, not tender


Integumentary: no rash, warm and dry


Ext: no edema


Neurologic: AO, non-focal


Ext: no edema, L BKA


Hemodialysis access: L arm AVF

## 2021-04-14 NOTE — XRAY REPORT
XR chest 1V ap



INDICATION / CLINICAL INFORMATION: hypertension.



COMPARISON: 11/13/2020



FINDINGS:



SUPPORT DEVICES: None.

HEART /PULMONARY VASCULATURE: No significant abnormality. 

LUNGS / PLEURA: Mild bibasilar interstitial edema or atelectasis. No focal airspace consolidation. No
 pneumothorax. 



ADDITIONAL FINDINGS: No significant additional findings.



IMPRESSION:

Mild bibasilar edema and/or atelectasis.



Signer Name: Micah Gonzalez MD 

Signed: 4/14/2021 9:04 AM

Workstation Name: [id number]

## 2021-04-14 NOTE — DISCHARGE SUMMARY
Providers





- Providers


Date of Admission: 


04/14/21 10:43





Date of discharge: 04/14/21


Attending physician: 


QIAN MOCTEZUMA





                                        





04/14/21 10:52


Consult to Physician [CONS] Urgent 


   Comment: 


   Consulting Provider: GIULIANO VERNON


   Physician Instructions: 


   Reason For Exam: End-stage renal needs dialysis











Primary care physician: 


PRIMARY CARE MD








Hospitalization


Condition: Stable


Hospital course: 





This is a 51-year-old male with history of end-stage renal disease on chronic 

dialysis, hypertension, hyperlipidemia, status post left leg BKA diabetes 

mellitus type 2 on insulin, history of COVID-19 infection last year in November,

  who went to his dialysis unit today and declined to dialyze him because his 

blood pressure was too high.  Patient was sent to ER for further evaluation 

management.  In the emergency his blood pressure was 227/125.  Patient was given

 labetalol 20 mg IV.  Nephrology was consulted in the ER for emergent 

hemodialysis.  Patient stated that he forgot to do his blood pressure medicine 

last night. Chest x-ray showed mild bibasilar edema, potassium was 5.5.  

Following hemodialysis patient blood pressure normalized and patient wanted to 

go home and did not want to stay in the hospital.  Stat potassium level ordered 

and patient plan to discharge home if repeat potassium level less than 5.0.  

Patient was counseled to be compliant with his BP medications and he verbalized 

understanding.








Disposition: DC-01 TO HOME OR SELFCARE


Final Discharge Diagnosis (Prints w/discharge instructions): Hypertensive 

urgency.  End-stage renal disease on hemodialysis.  Hyperkalemia.  

hyperphosphatemia.  Diabetes mellitus type 2.  History of COVID-19 infection


Time spent for discharge: 34 minutes





Core Measure Documentation





- Palliative Care


Palliative Care/ Comfort Measures: Not Applicable





- Core Measures


Any of the following diagnoses?: none





Exam





- Physical Exam


Narrative exam: 





GENERAL:  well-developed and well-nourished -American male lying on bed 

appeared to be in no discomfort. 


HEENT: Normocephalic.  Atraumatic.  No conjunctival congestion or icterus. 

Patient has moist mucous membranes.


NECK: Supple.  Trachea midline.


CHEST/LUNGS: Clear to auscultated bilaterally, breathing nonlabored. No wheezes 

crackles or rhonchi.


HEART/CARDIOVASCULAR: Regular in rate and rhythm.  S1 and S2 positive.


ABDOMEN: Abdomen is soft, nontender.  Patient has normal bowel sounds.  


SKIN: There is no rash.  Warm and dry.


NEURO:  No focal motor deficit.  Follows command.


MUSCULOSKELETAL: No joint effusion or tenderness.  Left leg BKA


EXTRIMITY: No edema, no cyanosis or clubbing.


PSYCH:  Cooperative.





- Constitutional


Vitals: 


                                        











Temp Pulse Resp BP Pulse Ox


 


 98.0 F   78   11 L  130/86   96 


 


 04/14/21 08:23  04/14/21 13:01  04/14/21 13:01  04/14/21 13:01  04/14/21 13:01














Plan


Activity: advance as tolerated


Diet: renal


Special Instructions: restrict fluid intake to (1.2 L dayly ), record daily BP 

diary


Follow up with: 


PRIMARY CARE,MD [Primary Care Provider] - 3-5 Days


GIULIANO VERNON MD [Staff Physician] - 7 Days


Prescriptions: 


Aspirin EC [Halfprin EC] 81 mg PO QDAY #30 tablet.

## 2021-04-14 NOTE — EMERGENCY DEPARTMENT REPORT
ED Chest Pain HPI





- General


Chief Complaint: High BP


Stated Complaint: HTN, DIALYSIS


Time Seen by Provider: 04/14/21 08:41


Source: patient


Mode of arrival: Stretcher


Limitations: No Limitations





- History of Present Illness


Severity scale (0 -10): 0





- Related Data


                                  Previous Rx's











 Medication  Instructions  Recorded  Last Taken  Type


 


Metoclopramide [Reglan TAB] 10 mg PO TID 30 Days #40 tab 10/22/19 12/29/20 16:00

Rx


 


ALBUTEROL NEB's [Proventil 0.083% 2.5 mg IH Q4HRT PRN  nebu 11/18/20 Unknown Rx





NEBS]    


 


Acetaminophen [Acetaminophen TAB] 650 mg PO Q4H PRN  tablet 11/18/20 Unknown Rx


 


Ascorbic Acid [Vitamin C] 500 mg PO QDAY  tablet 11/18/20 12/29/20 16:00 Rx


 


AtorvaSTATin [Lipitor] 80 mg PO QHS #60 tablet 11/18/20 12/29/20 16:00 Rx


 


Dexamethasone 6 mg PO DAILY #6 tablet 11/18/20 12/29/20 16:00 Rx


 


Insulin Lispro [Humalog] 8 unit SUB-Q TIDAC #1 vial 11/18/20 12/29/20 16:00 Rx


 


NIFEdipine XL [Procardia Xl] 90 mg PO QDAY #30 tablet 11/18/20 12/29/20 16:00 Rx


 


Pantoprazole [Protonix TAB] 40 mg PO QDAY 30 Days #30 tablet 11/18/20 12/29/20 

16:00 Rx


 


Sevelamer Carbonate [Renvela] 800 mg PO AC #30 tablet 11/18/20 12/29/20 16:00 Rx


 


Zinc Sulfate 220 mg PO QDAY  capsule 11/18/20 12/29/20 16:00 Rx


 


calcitrioL [Rocaltrol] 0.25 mcg PO QDAY  capsule 11/18/20 12/29/20 16:00 Rx


 


carvediloL [Coreg] 12.5 mg PO BID #60 tablet 11/18/20 12/29/20 16:00 Rx


 


cloNIDine [Catapres] 0.1 mg PO DAILY #30 tab 11/18/20 12/29/20 16:00 Rx


 


Acetaminophen [Acetaminophen TAB] 650 mg PO Q4H PRN  tablet 12/30/20 Unknown Rx


 


Dextrose 50% in Water [D50W (25GM) 0 ml IV Q30MIN PRN  syringe 12/30/20 Unknown 

Rx





Syringe]    


 


Epoetin Jason 10,000 Unit [Procrit] 10,000 unit IV ANALY PRN  vial 12/30/20 Unknown

 Rx


 


NIFEdipine XL [Procardia Xl] 90 mg PO QDAY@0600  tablet 12/30/20 Unknown Rx











                                    Allergies











Allergy/AdvReac Type Severity Reaction Status Date / Time


 


shellfish derived Allergy  Anaphylaxis Verified 10/22/19 07:36














ED Review of Systems


ROS: 


Stated complaint: HTN, DIALYSIS


Other details as noted in HPI








ED Past Medical Hx





- Past Medical History


Hx Hypertension: Yes


Hx Congestive Heart Failure: No


Hx Diabetes: Yes


Hx GERD: Yes


Hx Renal Disease: Yes


Hx Asthma: No


Hx COPD: No





- Surgical History


Additional Surgical History: graft





- Social History


Smoking Status: Current Every Day Smoker


Substance Use Type: Marijuana





- Medications


Home Medications: 


                                Home Medications











 Medication  Instructions  Recorded  Confirmed  Last Taken  Type


 


Metoclopramide [Reglan TAB] 10 mg PO TID 30 Days #40 tab 10/22/19 12/30/20 

12/29/20 16:00 Rx


 


ALBUTEROL NEB's [Proventil 0.083% 2.5 mg IH Q4HRT PRN  nebu 11/18/20 12/30/20 

Unknown Rx





NEBS]     


 


Acetaminophen [Acetaminophen TAB] 650 mg PO Q4H PRN  tablet 11/18/20 12/30/20 

Unknown Rx


 


Ascorbic Acid [Vitamin C] 500 mg PO QDAY  tablet 11/18/20 12/30/20 12/29/20 

16:00 Rx


 


AtorvaSTATin [Lipitor] 80 mg PO QHS #60 tablet 11/18/20 12/30/20 12/29/20 16:00 

Rx


 


Dexamethasone 6 mg PO DAILY #6 tablet 11/18/20 12/30/20 12/29/20 16:00 Rx


 


Insulin Lispro [Humalog] 8 unit SUB-Q TIDAC #1 vial 11/18/20 12/30/20 12/29/20 

16:00 Rx


 


NIFEdipine XL [Procardia Xl] 90 mg PO QDAY #30 tablet 11/18/20 12/30/20 12/29/20

16:00 Rx


 


Pantoprazole [Protonix TAB] 40 mg PO QDAY 30 Days #30 tablet 11/18/20 12/30/20 12/29/20 16:00 Rx


 


Sevelamer Carbonate [Renvela] 800 mg PO AC #30 tablet 11/18/20 12/30/20 12/29/20

 16:00 Rx


 


Zinc Sulfate 220 mg PO QDAY  capsule 11/18/20 12/30/20 12/29/20 16:00 Rx


 


calcitrioL [Rocaltrol] 0.25 mcg PO QDAY  capsule 11/18/20 12/30/20 12/29/20 

16:00 Rx


 


carvediloL [Coreg] 12.5 mg PO BID #60 tablet 11/18/20 12/30/20 12/29/20 16:00 Rx


 


cloNIDine [Catapres] 0.1 mg PO DAILY #30 tab 11/18/20 12/30/20 12/29/20 16:00 Rx


 


Acetaminophen [Acetaminophen TAB] 650 mg PO Q4H PRN  tablet 12/30/20  Unknown Rx


 


Dextrose 50% in Water [D50W (25GM) 0 ml IV Q30MIN PRN  syringe 12/30/20  Unknown

 Rx





Syringe]     


 


Epoetin Jason 10,000 Unit [Procrit] 10,000 unit IV ANALY PRN  vial 12/30/20  

Unknown Rx


 


NIFEdipine XL [Procardia Xl] 90 mg PO QDAY@0600  tablet 12/30/20  Unknown Rx














ED Physical Exam





- General


Limitations: No Limitations





ED Course





                                   Vital Signs











  04/14/21





  08:23


 


Temperature 98.0 F


 


Pulse Rate 99 H


 


Respiratory 15





Rate 


 


Blood Pressure 227/125





[Left] 


 


O2 Sat by Pulse 97





Oximetry 











Critical care attestation.: 


If time is entered above; I have spent that time in minutes in the direct care 

of this critically ill patient, excluding procedure time.








ED Disposition


Condition: Stable


Referrals: 


PRIMARY CARE,MD [Primary Care Provider] - 3-5 Days

## 2021-04-14 NOTE — EMERGENCY DEPARTMENT REPORT
ED General Adult HPI





- General


Chief complaint: High BP


Stated complaint: HTN, DIALYSIS


Time Seen by Provider: 04/14/21 08:41


Source: patient


Mode of arrival: Stretcher


Limitations: No Limitations





- History of Present Illness


Initial comments: 


This is a 51-year-old male on chronic dialysis.  He went to his unit today.  

They declined to dialyze him because his blood pressure was too high.  His 

systolic exceeded 200 upon my encounter.  He was given labetalol.  The patient 

was largely not inclined to provide any historical information but would 

seemingly answer questions hesitantly.  He seems to be unhappy that he was sent 

to the emergency department.  He stated that he took his clonidine and 

nifedipine at home.  He stated that the dialysis catheter gave him additional 

clonidine.  He is asymptomatic at the time here.  He denies chest pain or short

ness of breath.  He seems a bit sleepy.








Severity scale (0 -10): 0





- Related Data


                                  Previous Rx's











 Medication  Instructions  Recorded  Last Taken  Type


 


Metoclopramide [Reglan TAB] 10 mg PO TID 30 Days #40 tab 10/22/19 12/29/20 16:00

Rx


 


ALBUTEROL NEB's [Proventil 0.083% 2.5 mg IH Q4HRT PRN  nebu 11/18/20 Unknown Rx





NEBS]    


 


Acetaminophen [Acetaminophen TAB] 650 mg PO Q4H PRN  tablet 11/18/20 Unknown Rx


 


Ascorbic Acid [Vitamin C] 500 mg PO QDAY  tablet 11/18/20 12/29/20 16:00 Rx


 


AtorvaSTATin [Lipitor] 80 mg PO QHS #60 tablet 11/18/20 12/29/20 16:00 Rx


 


Dexamethasone 6 mg PO DAILY #6 tablet 11/18/20 12/29/20 16:00 Rx


 


Insulin Lispro [Humalog] 8 unit SUB-Q TIDAC #1 vial 11/18/20 12/29/20 16:00 Rx


 


NIFEdipine XL [Procardia Xl] 90 mg PO QDAY #30 tablet 11/18/20 12/29/20 16:00 Rx


 


Pantoprazole [Protonix TAB] 40 mg PO QDAY 30 Days #30 tablet 11/18/20 12/29/20 

16:00 Rx


 


Sevelamer Carbonate [Renvela] 800 mg PO AC #30 tablet 11/18/20 12/29/20 16:00 Rx


 


Zinc Sulfate 220 mg PO QDAY  capsule 11/18/20 12/29/20 16:00 Rx


 


calcitrioL [Rocaltrol] 0.25 mcg PO QDAY  capsule 11/18/20 12/29/20 16:00 Rx


 


carvediloL [Coreg] 12.5 mg PO BID #60 tablet 11/18/20 12/29/20 16:00 Rx


 


cloNIDine [Catapres] 0.1 mg PO DAILY #30 tab 11/18/20 12/29/20 16:00 Rx


 


Acetaminophen [Acetaminophen TAB] 650 mg PO Q4H PRN  tablet 12/30/20 Unknown Rx


 


Dextrose 50% in Water [D50W (25GM) 0 ml IV Q30MIN PRN  syringe 12/30/20 Unknown 

Rx





Syringe]    


 


Epoetin Jason 10,000 Unit [Procrit] 10,000 unit IV ANALY PRN  vial 12/30/20 Unknown

 Rx


 


NIFEdipine XL [Procardia Xl] 90 mg PO QDAY@0600  tablet 12/30/20 Unknown Rx











                                    Allergies











Allergy/AdvReac Type Severity Reaction Status Date / Time


 


shellfish derived Allergy  Anaphylaxis Verified 10/22/19 07:36














ED Review of Systems


ROS: 


Stated complaint: HTN, DIALYSIS


Other details as noted in HPI








ED Past Medical Hx





- Past Medical History


Hx Hypertension: Yes


Hx Congestive Heart Failure: No


Hx Diabetes: Yes


Hx GERD: Yes


Hx Renal Disease: Yes


Hx Asthma: No


Hx COPD: No





- Surgical History


Additional Surgical History: graft





- Social History


Smoking Status: Current Every Day Smoker


Substance Use Type: Marijuana





- Medications


Home Medications: 


                                Home Medications











 Medication  Instructions  Recorded  Confirmed  Last Taken  Type


 


Metoclopramide [Reglan TAB] 10 mg PO TID 30 Days #40 tab 10/22/19 12/30/20 

12/29/20 16:00 Rx


 


ALBUTEROL NEB's [Proventil 0.083% 2.5 mg IH Q4HRT PRN  nebu 11/18/20 12/30/20 

Unknown Rx





NEBS]     


 


Acetaminophen [Acetaminophen TAB] 650 mg PO Q4H PRN  tablet 11/18/20 12/30/20 

Unknown Rx


 


Ascorbic Acid [Vitamin C] 500 mg PO QDAY  tablet 11/18/20 12/30/20 12/29/20 

16:00 Rx


 


AtorvaSTATin [Lipitor] 80 mg PO QHS #60 tablet 11/18/20 12/30/20 12/29/20 16:00 

Rx


 


Dexamethasone 6 mg PO DAILY #6 tablet 11/18/20 12/30/20 12/29/20 16:00 Rx


 


Insulin Lispro [Humalog] 8 unit SUB-Q TIDAC #1 vial 11/18/20 12/30/20 12/29/20 

16:00 Rx


 


NIFEdipine XL [Procardia Xl] 90 mg PO QDAY #30 tablet 11/18/20 12/30/20 12/29/20

16:00 Rx


 


Pantoprazole [Protonix TAB] 40 mg PO QDAY 30 Days #30 tablet 11/18/20 12/30/20 12/29/20 16:00 Rx


 


Sevelamer Carbonate [Renvela] 800 mg PO AC #30 tablet 11/18/20 12/30/20 12/29/20

 16:00 Rx


 


Zinc Sulfate 220 mg PO QDAY  capsule 11/18/20 12/30/20 12/29/20 16:00 Rx


 


calcitrioL [Rocaltrol] 0.25 mcg PO QDAY  capsule 11/18/20 12/30/20 12/29/20 

16:00 Rx


 


carvediloL [Coreg] 12.5 mg PO BID #60 tablet 11/18/20 12/30/20 12/29/20 16:00 Rx


 


cloNIDine [Catapres] 0.1 mg PO DAILY #30 tab 11/18/20 12/30/20 12/29/20 16:00 Rx


 


Acetaminophen [Acetaminophen TAB] 650 mg PO Q4H PRN  tablet 12/30/20  Unknown Rx


 


Dextrose 50% in Water [D50W (25GM) 0 ml IV Q30MIN PRN  syringe 12/30/20  Unknown

 Rx





Syringe]     


 


Epoetin Jason 10,000 Unit [Procrit] 10,000 unit IV ANALY PRN  vial 12/30/20  

Unknown Rx


 


NIFEdipine XL [Procardia Xl] 90 mg PO QDAY@0600  tablet 12/30/20  Unknown Rx














ED Physical Exam





- General


Limitations: No Limitations





ED Course


                                   Vital Signs











  04/14/21 04/14/21 04/14/21





  08:23 08:24 08:30


 


Temperature 98.0 F  


 


Pulse Rate 99 H  92 H


 


Respiratory 15 15 17





Rate   


 


Blood Pressure   227/125


 


Blood Pressure 227/125  





[Left]   


 


O2 Sat by Pulse 97 96 98





Oximetry   














  04/14/21 04/14/21 04/14/21





  09:00 09:15 09:45


 


Temperature   


 


Pulse Rate 90 87 86


 


Respiratory 15 13 14





Rate   


 


Blood Pressure 202/105  186/105


 


Blood Pressure   





[Left]   


 


O2 Sat by Pulse 98 97 98





Oximetry   














- Reevaluation(s)


Reevaluation #1: 


Spoke with Aleksandra Sumerco nephrology.  Apparently this patient should be 

assigned to the on-call nephrologist who is 's session.  He has been paged.








ED Medical Decision Making





- Lab Data


Result diagrams: 


                                 04/14/21 08:50





                                 04/14/21 08:50





                         Laboratory Results - last 24 hr











  04/14/21 04/14/21





  08:50 08:50


 


WBC  10.4 


 


RBC  4.63 


 


Hgb  11.7 L 


 


Hct  35.8 


 


MCV  77 L 


 


MCH  25 L 


 


MCHC  33 


 


RDW  18.5 H 


 


Plt Count  319 


 


Sodium   139


 


Potassium   5.5 H


 


Chloride   94.0 L


 


Carbon Dioxide   28


 


Anion Gap   23


 


BUN   68 H


 


Creatinine   12.2 H


 


Estimated GFR   5


 


BUN/Creatinine Ratio   6


 


Glucose   244 H


 


Calcium   8.8


 


Phosphorus   8.30 H














Critical Care Time: Yes


Critical care time in (mins) excluding proc time.: 45


Critical care attestation.: 


If time is entered above; I have spent that time in minutes in the direct care 

of this critically ill patient, excluding procedure time.








ED Disposition


Clinical Impression: 


 ESRD needing dialysis, Hyperkalemia, Malignant hypertension





Hyperglycemia due to type 2 diabetes mellitus


Qualifiers:


 Diabetes mellitus long term insulin use: unspecified long term insulin use 

status Qualified Code(s): E11.65 - Type 2 diabetes mellitus with hyperglycemia





Disposition: DC-09 OP ADMIT IP TO THIS HOSP


Is pt being admited?: Yes


Does the pt Need Aspirin: No


Condition: Stable


Instructions:  Hypertension (ED), Diabetes Mellitus Type 2 in Adults (ED)


Referrals: 


PRIMARY CARE,MD [Primary Care Provider] - 3-5 Days


Time of Disposition: 10:42

## 2021-04-14 NOTE — HISTORY AND PHYSICAL REPORT
History of Present Illness


Date of examination: 04/14/21


Date of admission: 


04/14/21 10:43





Chief complaint: 





High blood pressure


History of present illness: 





This is a 51-year-old male with history of end-stage renal disease on chronic 

dialysis, hypertension, hyperlipidemia status post left leg BKA who went to his 

dialysis unit today and declined to dialyze him because his blood pressure was 

too high.  Patient was sent to ER for further evaluation management.  In the 

emergency his blood pressure was 227/125.  Patient was given labetalol 20 mg IV.

 Nephrology was consulted in the ER for emergent hemodialysis.  Patient stated 

that he forgot to do his blood pressure medicine last night.  Patient is being 

admitted for further evaluation and management.








Past medical History: h/o hypertension, diabetes mellitus type 2, 

hyperlipidemia, end-stage renal disease on hemodialysis





Past surgical History: s/p left leg BKA





Social History: Lives with family, denies any smoking, drinking and elicit drug 

abuse.





Family History: Significant for hypertension and diabetes





Review of System:


Constitutional: no fever, no chills, no weight loss


Ears, eyes, nose, mouth and throat: no nasal congestion, no nasal discharge, no 

sinus pressure, no vision change, no red eye.


Neck: No neck pain or rigidity.


Cardiovascular: No chest pain, no orthopnea, no palpitations, no leg swelling


Respiratory: No shortness of breath, no cough, no congestion, no wheezing


Gastrointestinal: no abdominal pain, no nausea, no vomiting


Genitourinary : no dysuria, no hematuria


Musculoskeletal: no joint swelling or muscle ache 


Integumentary: no rash, no pruritis


Neurological: no parathesias, no numbness, no tingling


Endocrine: no cold or heat intolerance, no polyuria or polydipsia


Hematologic/Lymphatic: no easy bruising, no easy bleeding, no gland swelling


Allergic/Immunologic: no urticaria, no angioedema.








Medications and Allergies


                                    Allergies











Allergy/AdvReac Type Severity Reaction Status Date / Time


 


shellfish derived Allergy  Anaphylaxis Verified 10/22/19 07:36











                                Home Medications











 Medication  Instructions  Recorded  Confirmed  Last Taken  Type


 


Metoclopramide [Reglan TAB] 10 mg PO TID 30 Days #40 tab 10/22/19 12/30/20 

12/29/20 16:00 Rx


 


ALBUTEROL NEB's [Proventil 0.083% 2.5 mg IH Q4HRT PRN  nebu 11/18/20 12/30/20 

Unknown Rx





NEBS]     


 


Acetaminophen [Acetaminophen TAB] 650 mg PO Q4H PRN  tablet 11/18/20 12/30/20 

Unknown Rx


 


Ascorbic Acid [Vitamin C] 500 mg PO QDAY  tablet 11/18/20 12/30/20 12/29/20 

16:00 Rx


 


AtorvaSTATin [Lipitor] 80 mg PO QHS #60 tablet 11/18/20 12/30/20 12/29/20 16:00 

Rx


 


Dexamethasone 6 mg PO DAILY #6 tablet 11/18/20 12/30/20 12/29/20 16:00 Rx


 


Insulin Lispro [Humalog] 8 unit SUB-Q TIDAC #1 vial 11/18/20 12/30/20 12/29/20 

16:00 Rx


 


NIFEdipine XL [Procardia Xl] 90 mg PO QDAY #30 tablet 11/18/20 12/30/20 12/29/20

16:00 Rx


 


Pantoprazole [Protonix TAB] 40 mg PO QDAY 30 Days #30 tablet 11/18/20 12/30/20 12/29/20 16:00 Rx


 


Sevelamer Carbonate [Renvela] 800 mg PO AC #30 tablet 11/18/20 12/30/20 12/29/20

 16:00 Rx


 


Zinc Sulfate 220 mg PO QDAY  capsule 11/18/20 12/30/20 12/29/20 16:00 Rx


 


calcitrioL [Rocaltrol] 0.25 mcg PO QDAY  capsule 11/18/20 12/30/20 12/29/20 

16:00 Rx


 


carvediloL [Coreg] 12.5 mg PO BID #60 tablet 11/18/20 12/30/20 12/29/20 16:00 Rx


 


cloNIDine [Catapres] 0.1 mg PO DAILY #30 tab 11/18/20 12/30/20 12/29/20 16:00 Rx


 


Acetaminophen [Acetaminophen TAB] 650 mg PO Q4H PRN  tablet 12/30/20  Unknown Rx


 


Dextrose 50% in Water [D50W (25GM) 0 ml IV Q30MIN PRN  syringe 12/30/20  Unknown

 Rx





Syringe]     


 


Epoetin Jason 10,000 Unit [Procrit] 10,000 unit IV ANALY PRN  vial 12/30/20  

Unknown Rx


 


NIFEdipine XL [Procardia Xl] 90 mg PO QDAY@0600  tablet 12/30/20  Unknown Rx











Active Meds: 


Active Medications





Acetaminophen (Acetaminophen 325 Mg Tab)  650 mg PO Q4H PRN


   PRN Reason: Pain MILD(1-3)/Fever >100.5/HA


Albuterol (Albuterol 2.5 Mg/3 Ml Nebu)  2.5 mg IH Q4HRT PRN


   PRN Reason: Shortness Of Breath


Ascorbic Acid (Ascorbic Acid 500 Mg Tab)  500 mg PO QDAY [facility]


Atorvastatin Calcium (Atorvastatin 40 Mg Tab)  80 mg PO QHS [facility]


Calcitriol (Calcitriol 0.25 Mcg Cap)  0.25 mcg PO QDAY [facility]


Carvedilol (Carvedilol 12.5 Mg Tab)  12.5 mg PO BID [facility]


Clonidine HCl (Clonidine 0.1 Mg Tab)  0.1 mg PO DAILY [facility]


Heparin Sodium (Porcine) (Heparin 10,000 Units/10 Ml Vial)  3,000 unit IV ANALY 

PRN


   PRN Reason: hemodialysis


Heparin Sodium (Porcine) (Heparin 10,000 Units/10 Ml Vial)  1,000 unit IV ANALY 

PRN


   PRN Reason: hemodialysis


Sodium Chloride (Nacl 0.9%)  100 mls @ 999 mls/hr IV ANALY PRN


   PRN Reason: Hypotension


Insulin Human Lispro (Insulin Lispro 100 Unit/Ml)  8 unit SUB-Q TIDAC [facility]


Metoclopramide HCl (Metoclopramide 10 Mg Tab)  10 mg PO 0800,2000 [facility]


Nifedipine (Nifedipine Xl 90 Mg Tab)  90 mg PO QDAY [facility]


Pantoprazole Sodium (Pantoprazole 40 Mg Tab)  40 mg PO QDAY [facility]


Sevelamer Carbonate (Sevelamer Carbonate 800 Mg Tab)  800 mg PO AC [facility]











Exam





- Physical Exam


Narrative exam: 





GENERAL:  well-developed and well-nourished   lying on bed appeared to be in no 

discomfort. 


HEENT: Normocephalic.  Atraumatic.  No conjunctival congestion or icterus. Mell

ent has moist mucous membranes.


NECK: Supple.  Trachea midline.


CHEST/LUNGS: Clear to auscultated bilaterally, breathing nonlabored. No wheezes 

crackles or rhonchi.


HEART/CARDIOVASCULAR: Regular in rate and rhythm.  S1 and S2 positive.


ABDOMEN: Abdomen is soft, nontender.  Patient has normal bowel sounds.  


SKIN: There is no rash.  Warm and dry.


NEURO:  No focal motor deficit.  Follows command.


MUSCULOSKELETAL: No joint effusion or tenderness.


EXTRIMITY: No edema, no cyanosis or clubbing.


PSYCH:  Cooperative.





- Constitutional


Vitals: 


                                        











Temp Pulse Resp BP Pulse Ox


 


 98.0 F   86   14   186/105   98 


 


 04/14/21 08:23  04/14/21 09:45  04/14/21 09:45  04/14/21 09:45  04/14/21 09:45














Results





- Labs


CBC & Chem 7: 


                                 04/14/21 08:50





                                 04/14/21 08:50


Labs: 


                              Abnormal lab results











  04/14/21 04/14/21 Range/Units





  08:50 08:50 


 


Hgb  11.7 L   (11.8-15.2)  gm/dl


 


MCV  77 L   (84-94)  fl


 


MCH  25 L   (28-32)  pg


 


RDW  18.5 H   (13.2-15.2)  %


 


Potassium   5.5 H  (3.6-5.0)  mmol/L


 


Chloride   94.0 L  ()  mmol/L


 


BUN   68 H  (9-20)  mg/dL


 


Creatinine   12.2 H  (0.8-1.3)  mg/dL


 


Glucose   244 H  ()  mg/dL


 


Phosphorus   8.30 H  (2.5-4.5)  mg/dL














Assessment and Plan





Hypertensive urgency


End-stage renal disease on hemodialysis


Hyperkalemia 


hyperphosphatemia


Diabetes mellitus type 2


DVT prophylaxis





-Blood pressure on admission was 227/ 125


-Patient received total 20 mg of labetalol IV in the ER


-Nephrology consulted for emergent hemodialysis


-We will resume his home meds and will give hydralazine IV as needed for 

systolic greater than 160


-We will place him on renal consistent carb diet, sliding scale of insulin


-Heparin for DVT prophylaxis


-Continue to monitor BP, if clinically improves possible discharge tomorrow

## 2021-04-15 VITALS — SYSTOLIC BLOOD PRESSURE: 132 MMHG | DIASTOLIC BLOOD PRESSURE: 67 MMHG

## 2021-04-15 NOTE — ELECTROCARDIOGRAPH REPORT
[facility]

                                       

Test Date:    2021               Test Time:    08:48:28

Pat Name:     FESTUS DUARTE              Department:   

Patient ID:   [id number]          Room:         A459

Gender:       M                        Technician:   JOÃO

:          1970               Requested By: AMEE FIERRO

Order Number: [id number]              Reading MD:   Sakina Pelletier

                                 Measurements

Intervals                              Axis          

Rate:         89                       P:            62

MO:           176                      QRS:          -36

QRSD:         103                      T:            85

QT:           406                                    

QTc:          494                                    

                           Interpretive Statements

Sinus rhythm

Probable left atrial enlargement

LAD, consider LAFB or inferior infarct

Consider old anterior infarct

No previous ECG available for comparison

Electronically Signed On 4- 11:40:12 EDT by Sakina Pelletier

## 2022-10-21 NOTE — CONSULTATION
History of Present Illness





- Reason for Consult


Consult date: 11/13/20


end stage renal disease





- History of Present Illness





Mr. Richmond is a 49yo with ESRD on HD who presents to the ED with c/o fever





He reports that he was in usual state of health until appx 1 week ago when he 

lost his sense of taste and appetite.  Patient reports subsequent onset of 

nausea/vomiting.  Symptoms remained unchanged until today when he reports onset 

of fever.  He denies SOB and cough.





In the ED, CXR obtained and was notable for patchy bibasilar airspace disease 

suspicious for PNA.  


 


He typically dialyzes every MWF and states that his last treatment was on 

Wednesday. 








Past History


Past Medical History: diabetes, ESRD, hypertension


Past Surgical History: Other (AV access creation)


Family history: no significant family history





Medications and Allergies


                                    Allergies











Allergy/AdvReac Type Severity Reaction Status Date / Time


 


shellfish derived Allergy  Anaphylaxis Verified 10/22/19 07:36











                                Home Medications











 Medication  Instructions  Recorded  Confirmed  Last Taken  Type


 


Insulin Aspart (Nf) [Novolog] 7 units SQ TID 10/22/19 11/13/20 10/21/19 17:00 

History


 


Metoclopramide [Reglan] 10 mg PO TID 30 Days #40 tab 10/22/19 11/13/20 Unknown 

Rx


 


Pantoprazole [Protonix] 40 mg PO QDAY 30 Days #30 tablet 10/22/19 11/13/20 

Unknown Rx


 


cloNIDine [Catapres] 0.1 mg PO DAILY 10/22/19 11/13/20 10/22/19 06:00 History











Active Meds: 


Active Medications





Acetaminophen (Tylenol)  650 mg PO Q4H PRN


   PRN Reason: Pain MILD(1-3)/Fever >100.5/HA


Albuterol (Proventil)  2.5 mg IH Q4HRT PRN


   PRN Reason: Shortness Of Breath


Ceftriaxone Sodium (Rocephin/Ns 2 Gm/100 Ml)  2 gm in 100 mls @ 200 mls/hr IV 

Q24HR [facility]; Protocol


   Stop: 11/15/20 23:59


Azithromycin 500 mg/ Sodium (Chloride)  250 mls @ 250 mls/hr IV Q24HR [facility]; 

Protocol


   Stop: 11/15/20 23:59


Sodium Chloride (Nacl 0.9%)  100 mls @ 999 mls/hr IV ANALY PRN


   PRN Reason: Hypotension


Ondansetron HCl (Zofran)  4 mg IV Q8H PRN


   PRN Reason: Nausea And Vomiting


Sodium Chloride (Sodium Chloride Flush Syringe 10 Ml)  10 ml IV BID [facility]


Sodium Chloride (Sodium Chloride Flush Syringe 10 Ml)  10 ml IV PRN PRN


   PRN Reason: LINE FLUSH











Review of Systems


All systems: negative





Exam





- Vital Signs


Vital signs: 


                                   Vital Signs











Resp Pulse Ox


 


 12   96 


 


 11/13/20 08:16  11/13/20 08:16














- Physical Exam


Narrative exam: 





Exam limited as isolation stethoscope is not present in room at time of visit.





- General Appearance


General appearance: well-developed, well-nourished


EENT: ATNC


Integumentary: no rash


Neurologic: alert and oriented x3


Musculoskeletal: Present: other (no edema)


Psychiatric: cooperative





Results





- Lab Results





                                 11/13/20 09:03





                                 11/13/20 09:03


                             Most recent lab results











Calcium  8.6 mg/dL (8.4-10.2)   11/13/20  09:03    














Assessment and Plan





Impression:


* End stage renal disease on HD MWF


* Pneumonia, bilateral - r/o COVID 19 PNA


* COVID 19 PUI


* Hypertension


* Type II DM


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


* Secondary hyperparathyroidism





Plan:


* Hemodialysis today - UF as tolerated


* Continue MWF schedule


* COVID 19 pending


* Dose medications for renal function


* Renal diet


* Epogen TIW prn Off meds  recheck